# Patient Record
Sex: MALE | Race: WHITE
[De-identification: names, ages, dates, MRNs, and addresses within clinical notes are randomized per-mention and may not be internally consistent; named-entity substitution may affect disease eponyms.]

---

## 2017-01-12 ENCOUNTER — HOSPITAL ENCOUNTER (OUTPATIENT)
Dept: HOSPITAL 80 - F3E | Age: 72
Setting detail: OBSERVATION
LOS: 1 days | Discharge: HOME | End: 2017-01-13
Attending: SPECIALIST | Admitting: SPECIALIST
Payer: COMMERCIAL

## 2017-01-12 DIAGNOSIS — I25.10: ICD-10-CM

## 2017-01-12 DIAGNOSIS — R35.0: ICD-10-CM

## 2017-01-12 DIAGNOSIS — N40.1: Primary | ICD-10-CM

## 2017-01-12 DIAGNOSIS — N13.9: ICD-10-CM

## 2017-01-12 DIAGNOSIS — Z95.5: ICD-10-CM

## 2017-01-12 DIAGNOSIS — I10: ICD-10-CM

## 2017-01-12 DIAGNOSIS — R97.20: ICD-10-CM

## 2017-01-12 DIAGNOSIS — R35.1: ICD-10-CM

## 2017-01-12 DIAGNOSIS — N42.0: ICD-10-CM

## 2017-01-12 DIAGNOSIS — E11.9: ICD-10-CM

## 2017-01-12 DIAGNOSIS — Z82.49: ICD-10-CM

## 2017-01-12 PROCEDURE — 0VC08ZZ EXTIRPATION OF MATTER FROM PROSTATE, VIA NATURAL OR ARTIFICIAL OPENING ENDOSCOPIC: ICD-10-PCS | Performed by: SPECIALIST

## 2017-01-12 PROCEDURE — 0VB08ZX EXCISION OF PROSTATE, VIA NATURAL OR ARTIFICIAL OPENING ENDOSCOPIC, DIAGNOSTIC: ICD-10-PCS | Performed by: SPECIALIST

## 2017-01-12 PROCEDURE — P9041 ALBUMIN (HUMAN),5%, 50ML: HCPCS

## 2017-01-12 PROCEDURE — 52601 PROSTATECTOMY (TURP): CPT

## 2017-01-12 PROCEDURE — G0378 HOSPITAL OBSERVATION PER HR: HCPCS

## 2017-01-12 NOTE — GHP
[f 
rep st]



                                                       PREOP HISTORY AND 
PHYSICAL





Amended report



ADMISSION DIAGNOSIS:  Benign prostatic hypertrophy with urinary obstruction and 
elevated prostate-specific antigen.



HISTORY OF PRESENT ILLNESS:  By history this is a 71-year-old gentleman who has 
been referred by Dr. Yahir Shafer for evaluation of urinary symptoms.  When 
we saw him, he had nocturia, frequency, and occasional urge incontinence.  He 
got up every 2 hours at nighttime to void, and hydration at night makes these 
symptoms worse.  He has had diabetes managed with metformin.  When we had seen 
him originally, his hemoglobin A1c at that time was 6.1.  He also had a 
prostate procedure in 2004, which was microwave of the prostate.  He did a 
trial of Flomax, which helped his flow but did not improve his nocturia.  His 
PSA was 4.4 in 2016 compared to 3.8 in 2014.  He had a PSA density of 0.9.  He 
had a 4K score of 26%.  On an ultrasound that was done on 12/27/2016, his 
prostate volume at that time was 51.5 g, and he had a cyst noted in the 
prostate as well as some calcifications, and there was no suspicious area 
noted.  We discussed with him that consideration of a prostate biopsy prior to 
the TURP, but because of his urinary symptoms recommended he undergo the TURP 
and to send specimen to the lab for final pathology.  He also has stones in the 
prostate that kind of indicate the potential for a GreenLight laser procedure.



PAST MEDICAL HISTORY:  BPH with lower urinary tract symptoms.  He has a history 
of hypertension, hypercholesterolemia.



PREVIOUS SURGERIES:  Angioplasty for coronary artery disease, ankle surgery, ___
______, and the previous transurethral microwave therapy of the prostate.



MEDICATIONS:  Amlodipine, aspirin, atorvastatin, lisinopril, metformin.



ALLERGIES:  Clindamycin.



FAMILY HISTORY:  Positive for heart disease, hypertension, liver cancer.



SOCIAL HISTORY:  Moderate alcohol consumption, nonsmoker.



REVIEW OF SYSTEMS:  Negative respiratory, GI, endocrine.  Cardiac is actually 
positive because of the previous coronary artery disease.



PHYSICAL EXAM:  GENERAL:  Mild abdominal obesity.  HEAD EARS, EYES, NOSE, THROAT
:  Normal.  CHEST:  Clear.  HEART:  Regular rate and rhythm.  ABDOMEN:  Obesity 
noted.  RECTAL:  Notes 50 g prostate with no nodules.  The consistency is firm 
consistent with previous microwave therapy.



PLAN:  At the present time he is admitted for transurethral resection of the 
prostate.  Indication, complications, and expectations discussed.  He did have 
a urodynamic study preoperative that showed his bladder capacity was somewhat 
diminished with a high detrusor voiding pressures up to 111 cm of water pressure
, and he had a peak flow of 7 mL/second, average flow of 3.5 mL/second, and a 
postvoid residual on that study date of 97 mL.  This has been discussed with 
him.  He is admitted for the procedure.





Job #:  [id number]



Add acc#, 01/12/17, olman COSTA

## 2017-01-12 NOTE — POSTOPPROG
Post Op Note


Date of Operation: 01/12/17


Surgeon: Maykel Shaikh


Anesthesiologist: Corey


Anesthesia: GET(General Endotracheal)


Pre-op Diagnosis: bPH


Post-op Diagnosis: SAME


Indication: BPH / URGE, FREQ, OBST


Procedure: TURP


Findings: BPH


Inf/Abcess present in the surg proc area at time of surgery?: No


EBL: Minimal


Complications: 


NONE


Drains: Other (HERR)


Specimen(s): 


SENT--DICTATED

## 2017-01-12 NOTE — GOP
[f rep st]



                                                                OPERATIVE REPORT





DATE OF OPERATION:  01/12/2017



SURGEON:  Maykel Shaikh MD



ANESTHESIA:  General anesthesia.



ANESTHESIOLOGIST:  Dr. Nicole.



PREOPERATIVE DIAGNOSIS:  BPH with urinary obstruction, elevated PSA, and prostatic stones.



POSTOPERATIVE DIAGNOSIS:  BPH with urinary obstruction, elevated PSA, and prostatic stones.



PROCEDURE PERFORMED:  Transurethral resection of the prostate.



FINDINGS:  



SPECIMENS:  Sent to Pathology.



ESTIMATED BLOOD LOSS:  Less than 50 mL.



DESCRIPTION OF PROCEDURE:  After undergoing general anesthesia, prep and drape in normal sterile fash
ion, appropriate time-out, the resectoscope was passed in the bladder under direct vision.  He had so
me fixation of the external sphincter that was somewhat fixed, probably from his previous microwave t
herapy.  Then, at that point, entering the bladder, he had no tumors, stones, foreign bodies in the b
ladder.  Because of the high elevated bladder neck, I started by taking that down with the bipolar lo
op and resected that, and then resected the right lateral lobe and right portion of the posterior lob
e, left lower lobe, and a left portion of the posterior lobe resected in a similar fashion.  He had a
 cystic area in the prostate that had multiple stones.  It was unroofed and then at the end of the pr
ocedure, Anel used to free his bladder of all chips and clots.  Visualization revealed no residual c
hips or clots.  Ureteral orifices preserved.  External sphincter approximated at the midline symmetri
thalia.  Verumontanum was preserved and Urojet placed in the urethra.  A 3-way Doshi catheter passed o
magda a Mandarin guide, 45 cc balloon inflated.  Traction placed, irrigated clear, and is being admitte
d for postoperative care.  I have discussed the findings and issues with his wife.





Job #:  [id number]

## 2017-01-13 VITALS
SYSTOLIC BLOOD PRESSURE: 128 MMHG | TEMPERATURE: 98.3 F | HEART RATE: 78 BPM | DIASTOLIC BLOOD PRESSURE: 77 MMHG | OXYGEN SATURATION: 95 %

## 2017-01-13 VITALS — RESPIRATION RATE: 16 BRPM

## 2017-01-13 NOTE — SOAPPROG
SOAP Progress Note


Assessment/Plan: 


Assessment:  BPH w urinary obs/LUTS   Acute  POD 1, path pending   








Plan: DC reyes and plan for home dc








01/13/17 08:44





Subjective: 


doing ok


Objective: 





 Vital Signs











Temp Pulse Resp BP Pulse Ox


 


 36.9 C   71   16   134/77 H  97 


 


 01/13/17 07:25  01/13/17 07:25  01/13/17 07:25  01/13/17 07:25  01/13/17 07:25








 











 01/12/17 01/13/17 01/14/17





 05:59 05:59 05:59


 


Intake Total  1710 


 


Output Total  4250 


 


Balance  -2540 














Physical Exam





- Physical Exam


General Appearance: alert


Respiratory: No respiratory distress


Abdomen: soft


Extremities: No calf tenderness


Neuro/Psych: alert, oriented x 3





ICD10 Worksheet


Patient Problems: 


 Problems











Problem Status Diagnosed


 


BPH w urinary obs/LUTS Acute 














- ICD10 Problem Qualifiers


(1) BPH w urinary obs/LUTS

## 2017-01-17 NOTE — GDS
[f 
rep st]



                                                             DISCHARGE SUMMARY





PREOPERATIVE AND POSTOPERATIVE DIAGNOSIS:  BPH with urinary obstruction and 
prostatic stones along with elevated PSA.  



The patient, after a long discussion of options and evaluation in our office, 
was found to have BPH and elected to have a transurethral resection of the 
prostate done.  This procedure was done without any difficulty.  The catheter 
was removed prior to patient's discharge and he reported voiding without 
difficulty in the hospital.  He is being discharged home in good condition and 
should follow up with our office in 3 weeks.





Job #:  [id number]

MTDD

## 2018-10-31 ENCOUNTER — HOSPITAL ENCOUNTER (OUTPATIENT)
Dept: HOSPITAL 80 - FCATH | Age: 73
Discharge: HOME | End: 2018-10-31
Attending: INTERNAL MEDICINE
Payer: COMMERCIAL

## 2018-10-31 DIAGNOSIS — E11.9: ICD-10-CM

## 2018-10-31 DIAGNOSIS — I48.2: ICD-10-CM

## 2018-10-31 DIAGNOSIS — I35.0: Primary | ICD-10-CM

## 2018-10-31 DIAGNOSIS — I25.10: ICD-10-CM

## 2018-10-31 DIAGNOSIS — I11.9: ICD-10-CM

## 2018-10-31 LAB
INR PPP: 1.01 (ref 0.83–1.16)
PLATELET # BLD: 209 10^3/UL (ref 150–400)
PROTHROMBIN TIME: 13.5 SEC (ref 12–15)

## 2018-10-31 NOTE — CPIP
DATE OF PROCEDURE:  10/31/2018



INDICATIONS FOR PROCEDURE:  Shortness of breath, chest pain, history of critical aortic stenosis.



PROCEDURE:  

1.  Nonselective left groin sheathogram.

2.  7-Mexican sheath left common femoral vein.

3.  Right heart catheterization with [place]-Shona catheter.

4.  Bilateral coronary angiography.

5.  Abdominal aorta angiography.



HISTORY:  This is a 73-year-old male with history of severe aortic stenosis, who has now been complai
clifford of shortness of breath and chest pain.  The patient saw CT surgery and was deemed to be a suitab
le candidate for open AVR.  The patient was sent for right and left heart catheterization for interve
ntion for open AVR.



DESCRIPTION OF PROCEDURE:  After informed consent, the patient was brought to [facility], where the left iwona
in was prepped and draped in sterile fashion.  Using lidocaine, a short 6-Mexican sheath in the left c
ommon femoral artery, 7-Mexican sheath in the left common femoral vein.  A [place]-Shona catheter was then
 advanced.  Wedge pressure was mean of 18, A-wave 19, V-wave 19, PA pressure systolic 31, diastolic 2
1, mean of 24, RV pressure systolic 37, diastolic 7, end of 12, RA pressure mean of 9, A-wave of 12, 
V-wave 11.  Cardiac output was measured to be 4.9 by Irma with the index 2.5.  PA sat was 74%.  AO sa
t was 96%.  S[place]-Shona catheter was then removed.  We then proceeded with a JL4 catheter to the left c
oronary artery.  Left coronary artery revealed a short left main.  Left circumflex artery was normal 
proximally, gave off a medium to large marginal artery which had extensive stenting in the proximal a
spect.  There was a focal 70% in-stent restenosis noted proximally in the stent.  There was a margina
l 2 artery giving off an LPLS which was extensively stented as well.  There was mild to moderate in-s
tent restenosis, but no focal high-grade stenosis in this vessel.  The LAD was a long vessel with onl
y mild plaque disease.  The LAD gave off a tiny diagonal artery proximally which had 40% proximal dis
ease, but again, this was a small vessel.  The LAD did have 30% disease in its proximal midportion, b
ut no focal high-grade stenosis.  After these images were obtained, the JL4 catheter removed.  A JR4 
catheter was then taken to the right coronary artery.  The JR4 catheter was advanced to the right cor
onary artery.  Images of the right coronary artery revealed normal ostial RCA.  The mid RCA was stent
ed and had mild in-stent restenosis, but no severe occlusion.  Distally, the RPDA appeared to be heal
thy and free of disease.  After these images were obtained, the JR4 catheter was removed.  The pigtai
l catheter was advanced to the ascending aorta.  Abdominal aortogram showed widely patent descending 
aorta, widely patent common, external and internal iliac arteries.  The pigtail catheter was then rem
teresa over the 3.5 wire, left groin was closed with manual pressure.  The patient tolerated the proced
ure well without complication.



IMPRESSION:  

1.  Severe focal in-stent restenosis of a marginal 1 artery.

2.  Moderate in-stent restenosis of the marginal 2 artery.

3.  A 30% disease of the proximal left anterior descending.

4.  Mild in-stent restenosis of the mid right coronary artery.

5.  Normal cardiac output.

6.  Normal pulmonic pressures.

7.  Widely patent aortoiliac conduit.



PLAN:  The patient would benefit from a single-vessel bypass to the marginal 1 artery, as this appear
s to be the highest grade lesion in the coronary circulation.  There is moderate in-stent restenosis 
of the marginal 2 artery, as well as mild to moderate in-stent restenosis of the right coronary arter
y, but these did not appear to be flow limiting.  We will present the case to Dr. Linares for further 
evaluation.





Job #:  [id number]

## 2018-11-04 NOTE — CPEKG
Test Reason : OPEN

Blood Pressure : ***/*** mmHG

Vent. Rate : 083 BPM     Atrial Rate : 150 BPM

   P-R Int : 155 ms          QRS Dur : 091 ms

    QT Int : 371 ms       P-R-T Axes : -04 049 092 degrees

   QTc Int : 436 ms

 

Sinus rhythm

Atrial premature complexes

Nonspecific T abnormalities, lateral leads

Minimal ST elevation, anterior leads

 

Confirmed by Ubaldo Jackson ([id number]) on 11/4/2018 2:50:08 PM

 

Referred By:             Confirmed By:Ubaldo Jackson

## 2018-11-06 ENCOUNTER — HOSPITAL ENCOUNTER (OUTPATIENT)
Dept: HOSPITAL 80 - FIMAGING | Age: 73
End: 2018-11-06
Attending: THORACIC SURGERY (CARDIOTHORACIC VASCULAR SURGERY)
Payer: COMMERCIAL

## 2018-11-06 DIAGNOSIS — M85.89: ICD-10-CM

## 2018-11-06 DIAGNOSIS — I25.10: Primary | ICD-10-CM

## 2018-11-08 ENCOUNTER — HOSPITAL ENCOUNTER (INPATIENT)
Dept: HOSPITAL 80 - F3E | Age: 73
LOS: 4 days | Discharge: HOME | DRG: 220 | End: 2018-11-12
Attending: THORACIC SURGERY (CARDIOTHORACIC VASCULAR SURGERY) | Admitting: THORACIC SURGERY (CARDIOTHORACIC VASCULAR SURGERY)
Payer: COMMERCIAL

## 2018-11-08 DIAGNOSIS — I10: ICD-10-CM

## 2018-11-08 DIAGNOSIS — D62: ICD-10-CM

## 2018-11-08 DIAGNOSIS — I35.0: Primary | ICD-10-CM

## 2018-11-08 DIAGNOSIS — Z95.5: ICD-10-CM

## 2018-11-08 DIAGNOSIS — E11.9: ICD-10-CM

## 2018-11-08 DIAGNOSIS — I25.10: ICD-10-CM

## 2018-11-08 DIAGNOSIS — N32.89: ICD-10-CM

## 2018-11-08 DIAGNOSIS — Z85.46: ICD-10-CM

## 2018-11-08 DIAGNOSIS — T82.855A: ICD-10-CM

## 2018-11-08 PROCEDURE — 021009W BYPASS CORONARY ARTERY, ONE ARTERY FROM AORTA WITH AUTOLOGOUS VENOUS TISSUE, OPEN APPROACH: ICD-10-PCS | Performed by: THORACIC SURGERY (CARDIOTHORACIC VASCULAR SURGERY)

## 2018-11-08 PROCEDURE — 02RF08Z REPLACEMENT OF AORTIC VALVE WITH ZOOPLASTIC TISSUE, OPEN APPROACH: ICD-10-PCS | Performed by: THORACIC SURGERY (CARDIOTHORACIC VASCULAR SURGERY)

## 2018-11-08 PROCEDURE — 5A1221Z PERFORMANCE OF CARDIAC OUTPUT, CONTINUOUS: ICD-10-PCS | Performed by: THORACIC SURGERY (CARDIOTHORACIC VASCULAR SURGERY)

## 2018-11-08 PROCEDURE — P9041 ALBUMIN (HUMAN),5%, 50ML: HCPCS

## 2018-11-08 PROCEDURE — 06BQ4ZZ EXCISION OF LEFT SAPHENOUS VEIN, PERCUTANEOUS ENDOSCOPIC APPROACH: ICD-10-PCS | Performed by: THORACIC SURGERY (CARDIOTHORACIC VASCULAR SURGERY)

## 2018-11-08 PROCEDURE — C1768 GRAFT, VASCULAR: HCPCS

## 2018-11-08 RX ADMIN — POTASSIUM CHLORIDE PRN MLS: 400 INJECTION, SOLUTION INTRAVENOUS at 20:29

## 2018-11-08 RX ADMIN — MUPIROCIN SCH APP: 20 OINTMENT TOPICAL at 21:06

## 2018-11-08 RX ADMIN — ALBUMIN (HUMAN) PRN MLS: 2.5 SOLUTION INTRAVENOUS at 18:04

## 2018-11-08 RX ADMIN — HYDROCODONE BITARTRATE AND ACETAMINOPHEN PRN TAB: 5; 325 TABLET ORAL at 22:59

## 2018-11-08 RX ADMIN — POTASSIUM CHLORIDE PRN MLS: 400 INJECTION, SOLUTION INTRAVENOUS at 17:52

## 2018-11-08 RX ADMIN — DOCUSATE SODIUM AND SENNOSIDES SCH: 50; 8.6 TABLET ORAL at 20:19

## 2018-11-08 RX ADMIN — Medication SCH MLS: at 21:06

## 2018-11-08 RX ADMIN — POTASSIUM CHLORIDE PRN MLS: 400 INJECTION, SOLUTION INTRAVENOUS at 17:44

## 2018-11-08 RX ADMIN — ALBUMIN (HUMAN) PRN MLS: 2.5 SOLUTION INTRAVENOUS at 19:59

## 2018-11-08 NOTE — PDANEPAE
ANE History of Present Illness





74 yo for avr/cabg





ANE Past Medical History





- Cardiovascular History


Hx Hypertension: Yes


Hx Arrhythmias: Yes


Hx Chest Pain: Yes


Hx Coronary Artery / Peripheral Vascular Disease: Yes


Hx CHF / Valvular Disease: Yes


Hx Palpitations: No


Cardiovascular History Comment: MILD AORTIC VALVE STENOSIS





- Pulmonary History


Hx COPD: No


Hx Asthma/Reactive Airway Disease: No


Hx Recent Upper Respiratory Infection: No


Hx Oxygen in Use at Home: No


Hx Sleep Apnea: No


Sleep Apnea Screening Result - Last Documented: Positive


Pulmonary History Comment: MOUNA TRIGGERS





- Neurologic History


Hx Cerebrovascular Accident: No


Hx Seizures: No


Hx Dementia: No





- Endocrine History


Hx Diabetes: Yes


Endocrine History Comment: TYPE 2





- Renal History


Hx Renal Disorders: Yes


Renal History Comment: BPH.  FREQUENCY.  HX OF TURP WITH CLAR 01/2017





- Liver History


Hx Hepatic Disorders: No





- Neurological & Psychiatric Hx


Hx Neurological and Psychiatric Disorders: No





- Cancer History


Hx Cancer: Yes


Cancer History Comment: BEING MONITORED CURRENTLY FOR PROSTATE CA WITH DAVE.  

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





- Congenital Disorder History


Hx Congenital Disorders: No





- GI History


Hx Gastrointestinal Disorders: No





- Other Health History


Other Health History: WEAR GLASSES.  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





- Chronic Pain History


Chronic Pain: No





- Surgical History


Prior Surgeries: 10/31/18 CATH WITH [facility].  TURP WITH DAVE 01/12/17.  

ANGIOPLASTY X2 2000.  LUMBAR LAMINECTOMY.  PROSTATE PROCEDURE.  RT WRIST ORIF.  

ANKLE.  T&A





ANE Review of Systems


Review of Systems: 








- Exercise capacity


METS (RN): 3 METS





ANE Patient History





- Allergies


Allergies/Adverse Reactions: 








clindamycin Allergy (Verified 11/05/18 12:09)


 Rash


codeine Allergy (Verified 11/05/18 12:09)


 Rash








- Home Medications


Home medications: home medication list seen and reviewed


Home Medications: 








Aspirin [Aspirin 325 mg (*)] 325 mg PO DAILY 01/02/17 [Last Taken 11/08/18]


Atorvastatin Calcium [Lipitor 40 mg (*)] 40 mg PO Q2D 01/02/17 [Last Taken 11/08 /18]


Lisinopril [Zestril 20 mg (*)] 20 mg PO DAILY 01/02/17 [Last Taken 11/08/18]


Multivitamins [Multivitamin (*)] 1 each PO DAILY 01/02/17 [Last Taken 11/08/18]


amLODIPine BESYLATE [Norvasc 10 mg (*)] 10 mg PO DAILY 01/02/17 [Last Taken 11/ 08/18]


metFORMIN HCL [Glucophage 500 mg (*)] 500 mg PO BIDMEAL 01/02/17 [Last Taken 11/ 08/18]


Mirabegron [Myrbetriq] 25 mg PO DAILY 10/30/18 [Last Taken 11/08/18]








- NPO status


NPO Status: no food or drink >8 hours


NPO Since - Liquids (Date): 11/08/18


NPO Since - Liquids (Time): 19:00


NPO Since - Solids (Date): 11/07/18


NPO Since - Solids (Time): 20:00





- Smoking Hx


Smoking Status: Never smoked





- Family Anes Hx


Family Hx Anesthesia Complications: NONE





ANE Labs/Vital Signs





- Vital Signs


Blood Pressure: 163/88


Heart Rate: 87


Respiratory Rate: 16


O2 Sat (%): 96


Height: 5 ft 8.11 in


Weight: 78.5 kg





ANE Physical Exam





- Airway


Neck exam: FROM


Mallampati Score: Class 2


Mouth exam: normal dental/mouth exam





- Pulmonary


Pulmonary: no respiratory distress





- Cardiovascular


Cardiovascular: regular rate and rhythym





- ASA Status


ASA Status: IV





ANE Anesthesia Plan


Anesthesia Plan: general endotracheal anesthesia


Lines/Monitors: arterial line, central line, HU

## 2018-11-08 NOTE — PDHPUP
History & Physical Update


H&P update statement: 


This history and physical update is based on an assessment of the patient which 

was completed after admission or registration (within 24 hours), but prior to 

the surgery/procedure.





H&P update: H&P reviewed & patient examined


H&P changes: severe obstructive in OM1

## 2018-11-09 LAB — PLATELET # BLD: 120 10^3/UL (ref 150–400)

## 2018-11-09 RX ADMIN — DOCUSATE SODIUM AND SENNOSIDES SCH TAB: 50; 8.6 TABLET ORAL at 20:43

## 2018-11-09 RX ADMIN — MUPIROCIN SCH APP: 20 OINTMENT TOPICAL at 20:57

## 2018-11-09 RX ADMIN — INSULIN LISPRO SCH UNITS: 100 INJECTION, SOLUTION INTRAVENOUS; SUBCUTANEOUS at 17:44

## 2018-11-09 RX ADMIN — Medication SCH MLS: at 06:18

## 2018-11-09 RX ADMIN — DOCUSATE SODIUM AND SENNOSIDES SCH TAB: 50; 8.6 TABLET ORAL at 09:29

## 2018-11-09 RX ADMIN — HYDROCODONE BITARTRATE AND ACETAMINOPHEN PRN TAB: 5; 325 TABLET ORAL at 09:28

## 2018-11-09 RX ADMIN — ASPIRIN 81 MG SCH MG: 81 TABLET ORAL at 09:29

## 2018-11-09 RX ADMIN — METOPROLOL TARTRATE SCH MG: 25 TABLET, FILM COATED ORAL at 14:42

## 2018-11-09 RX ADMIN — HYDROCODONE BITARTRATE AND ACETAMINOPHEN PRN TAB: 5; 325 TABLET ORAL at 04:33

## 2018-11-09 RX ADMIN — PANTOPRAZOLE SODIUM SCH MG: 40 TABLET, DELAYED RELEASE ORAL at 09:29

## 2018-11-09 RX ADMIN — Medication SCH MLS: at 13:39

## 2018-11-09 RX ADMIN — METOPROLOL TARTRATE SCH MG: 25 TABLET, FILM COATED ORAL at 20:43

## 2018-11-09 RX ADMIN — MUPIROCIN SCH APP: 20 OINTMENT TOPICAL at 09:31

## 2018-11-09 RX ADMIN — Medication SCH MLS: at 20:58

## 2018-11-09 NOTE — SOAPPROG
SOAP Progress Note


Assessment/Plan: 


POD #1: CABGx1 (SVG-OM2), AVR with #23 Magna bioprosthesis, EVH L thigh   





CAD with severe in-stent restenosis of OM2 s/p bypass with vein graft


- ASA/BB/statin for secondary prevention 


- CTs to remain, FC/AL to be removed


- [facility] to [facility]





Severe AS s/p AVR with bioprothesis 


- Mgmt as per CABG





Acute blood loss anemia 


- Stable without the need for transfusions 





DM 2 (A1c 6.5%)


- Insulin gtt to be converted to ISS


- Metformin to be restarted on discharge





Spastic bladder


- Home med restarted 





DVT prophylaxis


- SCDs only











Subjective: 


Denies pain/SOB. No complaints.


Objective: 





 Vital Signs











Temp Pulse Resp BP Pulse Ox


 


 37.0 C   90   10 L  110/50 L  94 


 


 11/09/18 05:00  11/09/18 06:00  11/09/18 06:00  11/09/18 06:00  11/09/18 06:00








 Laboratory Results





 11/09/18 03:05 





 11/09/18 03:05 





 











 11/08/18 11/09/18 11/10/18





 05:59 05:59 05:59


 


Intake Total  1923 


 


Output Total  847 350


 


Balance  1076 -350














Physical Exam





- Physical Exam


General Appearance: WD/WN, alert, no apparent distress


EENT: No scleral icterus (R), No scleral icterus (L)


Neck: normal inspection


Respiratory: No respiratory distress


Cardiac/Chest: regular rate, rhythm


Abdomen: non-tender, soft, No distended


Skin: normal color, warm/dry


Extremities: No pedal edema


Neuro/Psych: no motor/sensory deficits, alert, normal mood/affect, oriented x 3





ICD10 Worksheet


Patient Problems: 


 Problems











Problem Status Onset


 


Acute blood loss as cause of postoperative anemia Acute  


 


Aortic stenosis Acute  


 


Coronary stent restenosis Acute  


 


S/P coronary artery bypass graft x 1 Acute  


 


BPH w urinary obs/LUTS Acute

## 2018-11-09 NOTE — PDMN
Medical Necessity


Medical necessity: Pt meets inpt criteria per MD order and [facility] [address], Coronary 

Artery Bypass Graft, Medicare inpt only list, 4 days. 72 y/o admitted for AVR 

and CABG X 1 and post-op care, anticipate >2MN.

## 2018-11-09 NOTE — ASMTCMCOM
CM Note

 

CM Note                       

Notes:

Patient is POD #1 CABG x1 and AVR. He is stable and doing well.



He is normally independent and lives with his wife Fausto. Initial PT eval suggests that he 

will not have any d/c needs. Case Management available if this changes. 

 

Date Signed:  11/09/2018 01:03 PM

Electronically Signed By:Rayne Montalvo RN

## 2018-11-10 LAB — PLATELET # BLD: 121 10^3/UL (ref 150–400)

## 2018-11-10 RX ADMIN — INSULIN LISPRO SCH: 100 INJECTION, SOLUTION INTRAVENOUS; SUBCUTANEOUS at 13:43

## 2018-11-10 RX ADMIN — INSULIN LISPRO SCH: 100 INJECTION, SOLUTION INTRAVENOUS; SUBCUTANEOUS at 17:35

## 2018-11-10 RX ADMIN — ASPIRIN 81 MG SCH MG: 81 TABLET ORAL at 09:28

## 2018-11-10 RX ADMIN — AMIODARONE HYDROCHLORIDE SCH MG: 200 TABLET ORAL at 21:43

## 2018-11-10 RX ADMIN — DOCUSATE SODIUM AND SENNOSIDES SCH TAB: 50; 8.6 TABLET ORAL at 09:28

## 2018-11-10 RX ADMIN — OXYCODONE HYDROCHLORIDE AND ACETAMINOPHEN PRN TAB: 5; 325 TABLET ORAL at 17:37

## 2018-11-10 RX ADMIN — OXYCODONE HYDROCHLORIDE AND ACETAMINOPHEN PRN TAB: 5; 325 TABLET ORAL at 13:09

## 2018-11-10 RX ADMIN — PANTOPRAZOLE SODIUM SCH MG: 40 TABLET, DELAYED RELEASE ORAL at 09:28

## 2018-11-10 RX ADMIN — Medication SCH MLS: at 05:14

## 2018-11-10 RX ADMIN — MUPIROCIN SCH APP: 20 OINTMENT TOPICAL at 09:32

## 2018-11-10 RX ADMIN — METOPROLOL TARTRATE SCH MG: 25 TABLET, FILM COATED ORAL at 09:29

## 2018-11-10 RX ADMIN — DOCUSATE SODIUM AND SENNOSIDES SCH TAB: 50; 8.6 TABLET ORAL at 21:43

## 2018-11-10 RX ADMIN — AMIODARONE HYDROCHLORIDE SCH MG: 200 TABLET ORAL at 13:10

## 2018-11-10 RX ADMIN — METOPROLOL TARTRATE SCH MG: 25 TABLET, FILM COATED ORAL at 21:43

## 2018-11-10 RX ADMIN — HYDROCODONE BITARTRATE AND ACETAMINOPHEN PRN TAB: 5; 325 TABLET ORAL at 05:14

## 2018-11-10 RX ADMIN — INSULIN LISPRO SCH: 100 INJECTION, SOLUTION INTRAVENOUS; SUBCUTANEOUS at 10:07

## 2018-11-10 RX ADMIN — OXYCODONE HYDROCHLORIDE AND ACETAMINOPHEN PRN TAB: 5; 325 TABLET ORAL at 21:48

## 2018-11-10 NOTE — CPEKG
Test Reason : OPEN

Blood Pressure : ***/*** mmHG

Vent. Rate : 081 BPM     Atrial Rate : 082 BPM

   P-R Int : 191 ms          QRS Dur : 088 ms

    QT Int : 413 ms       P-R-T Axes : 084 050 109 degrees

   QTc Int : 480 ms

 

Sinus rhythm

Nonspecific T abnormalities, lateral leads

 

Confirmed by Harshad Mondragon ([id number]) on 11/10/2018 7:51:42 AM

 

Referred By:             Confirmed By:Harshad Mondragon

## 2018-11-10 NOTE — SOAPPROG
SOAP Progress Note


Assessment/Plan: 





POD #2: CABGx1 (SVG-OM2), AVR with #23 Magna bioprosthesis, EVH L thigh   





CAD with severe in-stent restenosis of OM2 s/p bypass with vein graft


- ASA/BB/statin for secondary prevention 


- CTs to remain 





Severe AS s/p AVR with bioprothesis 


- On ASA and Metoprolol 


- Lasix 40mg po x 1 today





Paroxysmal atrial fibrillation/flutter


- Remains in Afib since yesterday.  Rate in the 's.


- Amiodarone 400mg po BID started.


- Continue Metprolol 12.5mg po BID.


- May need to consider starting Coumaind if persists.


- Keep pacing wires in place for today. 





Acute blood loss anemia 


- Stable without the need for transfusions 





Leukocytosis


- Worsening with WBC 23.2 (16.5), patient afebrile.


- Check UA and repeat CXR in am.  


- Will follow





DM 2 (A1c 6.5%)


- On ISS


- Metformin to be restarted on discharge





Hx Prostate cancer and spastic bladder


- Followed by Urologist Pio Shaikh


- Home med restarted 





DVT prophylaxis


- SCDs only








Subjective: 





Patient reports suboptimal pain control.  





Objective: 





 Vital Signs











Temp Pulse Resp BP Pulse Ox


 


 36.6 C   95   16   119/71   95 


 


 11/10/18 07:27  11/10/18 07:27  11/10/18 07:27  11/10/18 07:27  11/10/18 07:27








 Laboratory Results





 11/10/18 05:30 





 11/10/18 05:30 





 











 11/09/18 11/10/18 11/11/18





 05:59 05:59 05:59


 


Intake Total 1923 1490 


 


Output Total 847 1455 


 


Balance 1076 35 














Physical Exam





- Physical Exam


General Appearance: WD/WN, alert, no apparent distress


Neck: supple


Respiratory: lungs clear, decreased breath sounds (bases), other (No wheezing, 

rhonchi, rales. )


Cardiac/Chest: irregularly irregular, other (no mumurs, rubs, gallops.  sternum 

stable, sternotomy c/d/i. )


Abdomen: normal bowel sounds, non-tender, soft


Skin: normal color, warm/dry


Extremities: other (warm, 1+ lower extremity pitting edema)


Neuro/Psych: alert, normal mood/affect, oriented x 3





ICD10 Worksheet


Patient Problems: 


 Problems











Problem Status Onset


 


Acute blood loss as cause of postoperative anemia Acute  


 


Aortic stenosis Acute  


 


BPH w urinary obs/LUTS Acute  


 


Coronary stent restenosis Acute  


 


S/P coronary artery bypass graft x 1 Acute

## 2018-11-11 LAB — PLATELET # BLD: 113 10^3/UL (ref 150–400)

## 2018-11-11 RX ADMIN — ASPIRIN 81 MG SCH MG: 81 TABLET ORAL at 09:41

## 2018-11-11 RX ADMIN — INSULIN LISPRO SCH: 100 INJECTION, SOLUTION INTRAVENOUS; SUBCUTANEOUS at 13:19

## 2018-11-11 RX ADMIN — AMIODARONE HYDROCHLORIDE SCH MG: 200 TABLET ORAL at 20:24

## 2018-11-11 RX ADMIN — OXYCODONE HYDROCHLORIDE AND ACETAMINOPHEN PRN TAB: 5; 325 TABLET ORAL at 10:39

## 2018-11-11 RX ADMIN — OXYCODONE HYDROCHLORIDE AND ACETAMINOPHEN PRN TAB: 5; 325 TABLET ORAL at 02:56

## 2018-11-11 RX ADMIN — DOCUSATE SODIUM AND SENNOSIDES SCH TAB: 50; 8.6 TABLET ORAL at 09:41

## 2018-11-11 RX ADMIN — PANTOPRAZOLE SODIUM SCH MG: 40 TABLET, DELAYED RELEASE ORAL at 09:40

## 2018-11-11 RX ADMIN — OXYCODONE HYDROCHLORIDE AND ACETAMINOPHEN PRN TAB: 5; 325 TABLET ORAL at 23:09

## 2018-11-11 RX ADMIN — METOPROLOL TARTRATE SCH MG: 25 TABLET, FILM COATED ORAL at 20:24

## 2018-11-11 RX ADMIN — AMIODARONE HYDROCHLORIDE SCH MG: 200 TABLET ORAL at 09:40

## 2018-11-11 RX ADMIN — OXYCODONE HYDROCHLORIDE AND ACETAMINOPHEN PRN TAB: 5; 325 TABLET ORAL at 06:08

## 2018-11-11 RX ADMIN — METOPROLOL TARTRATE SCH MG: 25 TABLET, FILM COATED ORAL at 09:42

## 2018-11-11 RX ADMIN — INSULIN LISPRO SCH: 100 INJECTION, SOLUTION INTRAVENOUS; SUBCUTANEOUS at 18:18

## 2018-11-11 RX ADMIN — INSULIN LISPRO SCH: 100 INJECTION, SOLUTION INTRAVENOUS; SUBCUTANEOUS at 09:25

## 2018-11-11 RX ADMIN — FUROSEMIDE SCH MG: 40 TABLET ORAL at 14:58

## 2018-11-11 RX ADMIN — HYDROCODONE BITARTRATE AND ACETAMINOPHEN PRN TAB: 5; 325 TABLET ORAL at 18:15

## 2018-11-11 RX ADMIN — DOCUSATE SODIUM AND SENNOSIDES SCH TAB: 50; 8.6 TABLET ORAL at 20:24

## 2018-11-11 NOTE — SOAPPROG
<Duke Garrett - Last Filed: 11/11/18 13:49>





SOAP Progress Note


Assessment/Plan: 


Assessment:





Agree with note. Doing well. Increase ambulation. D/C tubes. Home in 1-2 days.




















Plan:





11/11/18 13:49





Objective: 





 Vital Signs











Temp Pulse Resp BP Pulse Ox


 


 36.6 C   76   19   109/64   92 


 


 11/11/18 12:00  11/11/18 12:00  11/11/18 12:00  11/11/18 12:00  11/11/18 12:15








 Laboratory Results





 11/11/18 03:05 





 11/11/18 03:05 





 











 11/10/18 11/11/18 11/12/18





 05:59 05:59 05:59


 


Intake Total 1490 1403 


 


Output Total 1455 1495 350


 


Balance 35 -92 -350














ICD10 Worksheet


Patient Problems: 


 Problems











Problem Status Onset


 


Acute blood loss as cause of postoperative anemia Acute  


 


Aortic stenosis Acute  


 


BPH w urinary obs/LUTS Acute  


 


Coronary stent restenosis Acute  


 


S/P coronary artery bypass graft x 1 Acute  














<Lilian Dodge - Last Filed: 11/11/18 14:33>





SOAP Progress Note


Assessment/Plan: 





POD #3: CABGx1 (SVG-OM2), AVR with #23 Magna bioprosthesis, EVH L thigh   





CAD with severe in-stent restenosis of OM2 s/p bypass with vein graft


- ASA/BB/statin for secondary prevention 


- CTs to be removed today.





Severe AS s/p AVR with bioprothesis 


- On ASA and Metoprolol 


- Will start Lasix 40mg daily.





Paroxysmal atrial fibrillation/flutter


- Back in SR 80's today.  


- On Amiodarone 400mg po BID and Metoprolol 12.5mg po BID.


- Will d/c pacing wires today.  


 


Acute blood loss anemia 


- Stable without the need for transfusions 





Leukocytosis


- Improving with WBC 19.0 (23.2), patient afebrile.


- UA negative and CXR unremarkable. 


- Will follow





DM 2 (A1c 6.5%)


- On ISS


- Metformin to be restarted on discharge





Hx Prostate cancer and spastic bladder


- Followed by Urologist Pio Shaikh


- Home med restarted 





DVT prophylaxis


- SCDs only





   





Subjective: 





Patient without complaints.  Reports good pain control.  











Objective: 





 Vital Signs











Temp Pulse Resp BP Pulse Ox


 


 36.6 C   76   19   109/64   92 


 


 11/11/18 12:00  11/11/18 12:00  11/11/18 12:00  11/11/18 12:00  11/11/18 12:15








 Laboratory Results





 11/11/18 03:05 





 11/11/18 03:05 





 











 11/10/18 11/11/18 11/12/18





 05:59 05:59 05:59


 


Intake Total 1490 1403 


 


Output Total 1455 1495 350


 


Balance 35 -92 -350














Physical Exam





- Physical Exam


General Appearance: WD/WN, alert, no apparent distress


Neck: supple


Respiratory: lungs clear, decreased breath sounds (bases)


Cardiac/Chest: regular rate, rhythm, other (No murmurs, rubs, gallops.  Sternum 

stable.  Sternotomy c/d/i. )


Abdomen: normal bowel sounds, non-tender, soft


Skin: normal color, warm/dry


Extremities: other (Warm, minimal lower extremity edema.  Leg incision c/d/i. )


Neuro/Psych: alert, normal mood/affect, oriented x 3

## 2018-11-11 NOTE — ASMTCMCOM
CM Note

 

CM Note                       

Notes:

CM discussed case with RN, patient likely to discharge in 1-2 days independent to home. PT 

recommendation is outpatient rehab and cardiac rehab. CM to follow. 



Current discharge plan: home independent 1-2 days.

 

Date Signed:  11/11/2018 04:51 PM

Electronically Signed By:Kiley Albrecht

## 2018-11-12 VITALS — SYSTOLIC BLOOD PRESSURE: 118 MMHG | DIASTOLIC BLOOD PRESSURE: 76 MMHG

## 2018-11-12 LAB — PLATELET # BLD: 112 10^3/UL (ref 150–400)

## 2018-11-12 RX ADMIN — ASPIRIN 81 MG SCH MG: 81 TABLET ORAL at 08:55

## 2018-11-12 RX ADMIN — DOCUSATE SODIUM AND SENNOSIDES SCH TAB: 50; 8.6 TABLET ORAL at 08:55

## 2018-11-12 RX ADMIN — FUROSEMIDE SCH MG: 40 TABLET ORAL at 08:55

## 2018-11-12 RX ADMIN — OXYCODONE HYDROCHLORIDE AND ACETAMINOPHEN PRN TAB: 5; 325 TABLET ORAL at 06:03

## 2018-11-12 RX ADMIN — PANTOPRAZOLE SODIUM SCH MG: 40 TABLET, DELAYED RELEASE ORAL at 08:55

## 2018-11-12 RX ADMIN — OXYCODONE HYDROCHLORIDE AND ACETAMINOPHEN PRN TAB: 5; 325 TABLET ORAL at 08:56

## 2018-11-12 RX ADMIN — METOPROLOL TARTRATE SCH MG: 25 TABLET, FILM COATED ORAL at 08:56

## 2018-11-12 RX ADMIN — AMIODARONE HYDROCHLORIDE SCH MG: 200 TABLET ORAL at 08:55

## 2018-11-12 RX ADMIN — INSULIN LISPRO SCH: 100 INJECTION, SOLUTION INTRAVENOUS; SUBCUTANEOUS at 08:06

## 2018-11-12 NOTE — GOP
DATE OF OPERATION:  11/08/2018



SURGEON:  Regulo Linares MD



ASSISTANT:  Jesús Abel PA-C



PREOPERATIVE DIAGNOSIS:  

1.  Severe aortic stenosis.

2.  Single-vessel coronary artery disease.



POSTOPERATIVE DIAGNOSIS:  

1.  Severe aortic stenosis.

2.  Single-vessel coronary artery disease.



PROCEDURE PERFORMED:  

1.  Aortic valve replacement using a 23 mm Magna bioprosthesis.

2.  Single-vessel coronary artery bypass grafting with saphenous vein graft from aorta to M2.

3.  Endoscopic vein harvest from the left thigh.



FINDINGS:  





DESCRIPTION OF PROCEDURE:  Patient was taken to the operating room and placed on the operating table 
in the supine position.  After the induction of general anesthesia and single-lumen endotracheal tube
 intubation, patient was prepped and draped sterilely.  A standard median sternotomy was performed wh
ile the saphenous vein was harvested from the upper portion of the left leg using a minimally invasiv
e endoscopic technique.  Patient was next heparinized and then cannulated with the Sarns 8.0 soft-corinna
w aortic cannula as well as a dual-stage venous right atrial cannula.  Cardiopulmonary bypass was ins
tituted and the distal vessel was marked for grafting.  The cross clamp was applied and the heart was
 arrested with 1 L of [place] solution.  The marginal branch of the circumflex was dissected open, p
robed, and anastomosed end-to-side to a vein graft using running 7-0 Prolene.  Next, the aorta was op
ened.  A trileaflet heavily calcified valve was encountered.  It was resected and the annulus was met
iculously debrided.  It was then sized to a 23 mm Magna.  Sutures were placed around the annulus.  We
 put some on the ventricular side and the valve was seated without difficulty.  The aorta was then cl
osed in 2 layers and the cross clamp was removed.  The partial occlusion clamp was placed and the vei
n graft was anastomosed end-to-side to the ascending aorta using running 6-0 Prolene suture.  The 
ss clamp was then removed.  Two ventricular pacing wires were then placed.  Patient was  fro
m bypass without difficulty and the post pump transesophageal echo showed a normally functioning biop
rosthetic valve in the aortic position and preservation of left ventricular function.  Once this was 
confirmed, the Protamine was administered and the patient was decannulated.  All of the cannulation s
ites were doubly secured with Prolene suture, and after hemostasis had been achieved, the heart was t
hen covered with pericardium and fat and the chest was closed with #6 stainless steel wires.  Subcuta
neous tissue and skin were closed with running Vicryl suture.  Patient tolerated the procedure well.



HISTORY:  The patient has been experiencing progressive dyspnea on exertion and was found to have sev
ere symptomatic aortic stenosis.  Cardiac catheterization reveals single-vessel coronary artery disea
se.  He is a low risk patient and is now undergoing surgical valve replacement.





Job #:  [id number]

## 2018-11-12 NOTE — CPEKG
Test Reason : OPEN

Blood Pressure : ***/*** mmHG

Vent. Rate : 076 BPM     Atrial Rate : 319 BPM

   P-R Int : 164 ms          QRS Dur : 089 ms

    QT Int : 399 ms       P-R-T Axes : 093 047 107 degrees

   QTc Int : 449 ms

 

Atrial fibrillation

Probable LVH with secondary repol abnrm

 

Confirmed by Clarence Marx ([id number]) on 11/12/2018 10:58:27 AM

 

Referred By:             Confirmed By:Clarence Marx

## 2018-11-12 NOTE — PDDCSUM
Discharge Summary


Discharge Summary: 





ADMISSION DATE:  


11/8/18





DISCHARGE DATE:  


11/12/18





ADMISSION DIAGNOSES 


1. CAD with severe in-stent restenosis of OM2


2. Severe aortic stenosis 


3. Rate controlled paroxysmal atrial fibrillation 


4. DM type 2 (A1c 6.5%)





DISCHARGE DIAGNOSES


1. CAD with severe in-stent restenosis of OM2


2. Severe aortic stenosis 


3. Rate controlled paroxysmal atrial fibrillation 


4. DM type 2 (A1c 6.5%)


5. Acute blood loss anemia 








PROCEDURES


11/8/18, Regulo Parks:


1. CABGx1 (SVG-OM2), AVR with #23 Magna bioprosthesis, EVH L thigh   





HPI


73M with aortic stenosis and CAD admitted for elective AVR and CABG. 





HOSPITAL COURSE BY PROBLEM LIST 


1. CAD with severe in-stent restenosis of OM2 - s/p CABGx1. Continue ASA/BB/

statin for secondary prevention. 


2. Severe aortic stenosis - stable s/p bioprosthetic AVR. Continue ASA.  


3. Rate controlled paroxysmal atrial fibrillation - managed by Dr. Breaux at 

[facility]. CHADS-VASC score of 3.  Prior to surgery, as per patient and 

family, anticoagulation was never discussed. Patient and family educated 

regarding risk of stroke and have decided to defer decision until after 

surgical recovery. Continue amiodarone (1-month duration) and beta-blocker for 

rate control.    


4. DM type 2 (A1c 6.5%) - Metformin restarted on discharge.   


5. Acute blood loss anemia - stable without the need for transfusions. 





CONDITION


Good





PERTINENT DISCHARGE CLINICAL INFORMATION


Vitals: 118/76, 71 irregularly irregular, 92% on RA, +6kg 


Exam: S1S2, No resp distress, ND, soft, NTP, LE with trace edema BL





DISPOSITION


Home, self-care





ACTIVITY


Pt was instructed on sternal precautions, activity limitations, and which 

problems to call [facility] with. Please see Discharge Plan in chart for 

specifics. 





DISCHARGE MEDICATIONS


Continue:


Multivitamin, ASA, Metformin, Atorvastatin, Mirabegron





New:


Amiodarone (twice daily for  days, then daily until tablet run out), Lasix 40 

mg daily, Metoprolol 12.5 mg BID, Percocet 5/325 (#30) q4h prn, Tylenol prn.  





Stop


Norvasc, Lisinopril 





PENDING STUDIES/LABS


1. CXR prior to surgical follow-up





FOLLOW-UP


1. Regulo Parks, 11/20/18, 9:00 AM

## 2018-11-12 NOTE — ECHO
[email or web address]





[address] [address] 

Main: [phone] 



Fax: 



Transthoracic Echocardiogram 

Name:             SUN CASTRO                            MR#:

[id number]

Study Date:       2018                             Study Time:

10:23 AM

YOB: 1945                             Age:

73 year(s)

Height:           172.7 cm (68 in.)                      Weight:

85.73 kg (189 lb.)

BSA:              2 m2                                   Gender:

Male

Examination:      Echo                                   Indication:

S/P AVR/assess valve and EF

Image Quality:    Good                                   Contrast: 

Requested by:     Lilian Dodge                             BP:

118 mmHg/76 mmHg

Heart Rate:                                              Rhythm: 

Indication:       S/P AVR/assess valve and EF 



Procedure Staff 

Ultrasound Technician:   Tanisha Washington RDCS 

Reading Physician:       Regulo No MD 

Requesting Provider: 



Conclusions:          Normal size left ventricle.  

Normal global systolic LV function.  

The ejection fraction is estimated to be 55-60 %.  

No regional wall motion abnormality.  

Unable to assess diastolic dysfunction due to underlying rhythm.  

The rhythm is atrial fibrillation.  

The left atrium is mildly dilated.  

Mild mitral valve regurgitation is present.  

The aortic valve is a bioprosthesis.  

No prosthesis regurgitation.  

AV max PG is 19mmHG. AV mean PG is 10mmHG..  

Trivial tricuspid valve regurgitation.  

RVSP is 35mmHG.. 



Measurements: 

Chambers                    Valvular Assessment AV/MV

Valvular Assessment TV/PV



Normal                                  Normal

Normal

Name         Value    Range             Name         Value Range

Name          Value Range

Ao Maru (MM): 3.3 cm   (2.2 cm-3.7           AV Vmax:     2.18 m/s (1

m/s-1.7       TR Vmax:      2.76 mm/s ( - )



cm)                                 m/s)             TR PGmax:     30

mmHg ( - )

LVDd (2D):   4.8 cm   (4.2 cm-5.9           AV maxP mmHg ( - )

syst. PAP: 35 mmHg ( - )



cm)               AV meanPG:   10 mmHg ( - )  

LVEF (MOD4): 67 %     (>=55 %)          MV E Vmax:   1.33 m/s ( - )  

EF Range:    55-60 % 



Continued Measurements: 

Chambers                    Valvular Assessment AV/MV

Valvular Assessment TV/PV



Name                    Value           Name                    Value

Name                    Value



Patient: SUN CASTRO                         MRN: [id number]

Study Date: 2018   Page 1 of 2

10:23 AM 









LADs:                    4.2 cm MV E/E' Septal:          17.80    CVP

(est.):             5 mmHg

LADs Lon.9 cm MV E/E' Lateral:         20.40

LA Area:

24.9 cm2

LA Volume:

84 ml

LA Volume Index:

42.0 ml/m2



Findings:             Left Ventricle: 

Normal size left ventricle. No LV hypertrophy. Normal global systolic

LV function. The ejection

fraction is estimated to be 55-60 %. No regional wall motion

abnormality. Unable to assess diastolic

dysfunction due to underlying rhythm. The rhythm is atrial

fibrillation.

Right Ventricle: 

Normal size right ventricle.  

Left Atrium: 

The left atrium is mildly dilated.  

Right Atrium: 

The right atrium is normal in size.  

Mitral Valve: 

The mitral valve is normal in appearance and function. Mild mitral

valve regurgitation is present.

Aortic Valve: 

The aortic valve is a bioprosthesis. No prosthesis regurgitation. AV

max PG is 19mmHG. AV mean

PG is 10mmHG..  

Tricuspid Valve: 

The tricuspid valve is normal in appearance and function. Trivial

tricuspid valve regurgitation. RVSP

is 35mmHG..  

Pulmonic Valve: 

Pulmonary valve not well visualized.  

Aorta: 

The aorta is normal.  

Pericardium: 

No pericardial effusion. 







Electronically signed by Regulo No MD on 2018 at 02:14 PM 

(No Signature Object) 



Patient: SUN CASTRO                         MRN: [id number]

Study Date: 2018   Page 2 of 2

10:23 AM 







D:_[id number]

## 2018-11-12 NOTE — ASDISCHSUM
----------------------------------------------

Discharge Information

----------------------------------------------

Plan Status:Home with No Needs                       Medically Cleared to Leave:11/11/2018

Discharge Date:11/12/2018 01:24 PM                   CM D/C Disposition:Home, Routine, Self-Care

ADT D/C Disposition:Home, Routine, Self-Care         Projected Discharge Date:11/12/2018 01:24 PM

Transportation at D/C:Family                         Discharge Delay Reason:

Follow-Up Date:11/12/2018 01:24 PM                   Discharge Slot:

Final Diagnosis:

----------------------------------------------

Placement Information

----------------------------------------------

----------------------------------------------

Patient Contact Information

----------------------------------------------

Contact Name:GRISEL                         Relationship:Wife

Address:[address] [facility] [phone]

                                                     Work [phone]

City:[place]                                        Alternate Phone:

[facility]/Zip Code:[place] [address]                              Email:

----------------------------------------------

Financial Information

----------------------------------------------

Financial Class:Medicare Advantage Plans

Primary Plan Desc:[employer]         Primary Plan Number:[id number]

Secondary Plan Desc:                                 Secondary Plan Number:

 

 

----------------------------------------------

Assessment Information

----------------------------------------------

----------------------------------------------

LACE

----------------------------------------------

LACE

 

Length of stay for            Answers:  4-6 days                              

current admission                                                             

Acuity / Level of             Answers:  Yes                                   

Care: Did the patient                                                         

have an inpatient                                                             

admission?                                                                    

Comorbidities - select        Answers:  Any tumor (including                  

all that apply                          lymphoma or leukemia)                 

                                        Congestive heart failure              

                                        Coronary Artery Disease               

                                        Diabetes (uncontrolled or             

                                        controlled)                           

                                        Other                         Notes:  HTN

# of Emergency department     Answers:  0                                     

visits in the last 6                                                          

months                                                                        

Score: 15

 

Date Signed:  11/12/2018 02:36 PM

Electronically Signed By:Latosha Davies RN

 

 

----------------------------------------------

[facility] CM Progress Note

----------------------------------------------

CM Note

 

CM Note                       

Notes:

Patient is POD #1 CABG x1 and AVR. He is stable and doing well.



He is normally independent and lives with his wife Fausto. Initial PT eval suggests that he 

will not have any d/c needs. Case Management available if this changes. 

 

Date Signed:  11/09/2018 01:03 PM

Electronically Signed By:Rayne Montalvo RN

 

 

----------------------------------------------

[facility] CM Progress Note

----------------------------------------------

CM Note

 

CM Note                       

Notes:

CM discussed case with RN, patient likely to discharge in 1-2 days independent to home. PT 

recommendation is outpatient rehab and cardiac rehab. CM to follow. 



Current discharge plan: home independent 1-2 days.

 

Date Signed:  11/11/2018 04:51 PM

Electronically Signed By:iKley Albrecht

 

 

----------------------------------------------

Case Management Discharge Plan Note

----------------------------------------------

Case Management Discharge

 

Discharge Order Complete?     Answers:  Yes                                   

Patient to Obtain             Answers:  via Family                            

Medications                                                                   

Transportation Arranged       Answers:  Family/Friends                        

Discharge Comments            

Notes:

11/12/2018 Case Management Note



Pt discharged with family support and outpatient cardiac rehab.

 

Date Signed:  11/12/2018 02:38 PM

Electronically Signed By:Latosha Davies RN

 

 

----------------------------------------------

Intervention Information

----------------------------------------------

Intervention Type:*IM-Signed                         Date of Service:11/12/2018 11:57 AM

Patient Type:Inpatient                               Staff Member:Jeny Iverson

Hours:                                               Discipline:

Severity:                                            Comment:

## 2018-11-12 NOTE — SOAPPROG
SOAP Progress Note


Assessment/Plan: 


POD #4: CABGx1 (SVG-OM2), AVR with #23 Magna bioprosthesis, EVH L thigh   





CAD with severe in-stent restenosis of OM2 s/p bypass with vein graft


- ASA/BB/statin for secondary prevention 





Severe AS s/p AVR with bioprothesis 


- Mgmt as per CABG


- Post-op ECHO this AM 





Acute blood loss anemia 


- Stable without the need for transfusions 





DM 2 (A1c 6.5%)


- Metformin restarted 





h/o PAF


- Continue beta-blocker/amiodarone 


- Appears to be in PAF this AM although could be PACs/PVCs - will obtain 12-

lead EKG 


- Discussed possible need for AC - will d/w Dr. Linares 





Spastic bladder


- Stable





DVT prophylaxis


- SCDs only





Disposition 


- Discharge home today 








Subjective: 


Feels well. Ready to go home.


Objective: 





 Vital Signs











Temp Pulse Resp BP Pulse Ox


 


 36.8 C   76   14   128/71 H  98 


 


 11/12/18 03:35  11/12/18 03:35  11/12/18 03:35  11/12/18 03:35  11/12/18 03:35








 Laboratory Results





 11/12/18 03:22 





 11/12/18 03:22 





 











 11/11/18 11/12/18 11/13/18





 05:59 05:59 05:59


 


Intake Total 1403 1170 


 


Output Total 1495 1975 


 


Balance -92 -805 














Physical Exam





- Physical Exam


General Appearance: WD/WN


EENT: No scleral icterus (R), No scleral icterus (L)


Neck: normal inspection


Respiratory: No respiratory distress


Cardiac/Chest: regular rate, rhythm, extra beats, irregularly irregular (?)


Abdomen: normal bowel sounds, non-tender, soft


Skin: normal color, warm/dry


Extremities: No pedal edema


Neuro/Psych: no motor/sensory deficits, alert, normal mood/affect, oriented x 3





ICD10 Worksheet


Patient Problems: 


 Problems











Problem Status Onset


 


Acute blood loss as cause of postoperative anemia Acute  


 


Aortic stenosis Acute  


 


BPH w urinary obs/LUTS Acute  


 


Coronary stent restenosis Acute  


 


S/P coronary artery bypass graft x 1 Acute

## 2019-01-08 ENCOUNTER — HOSPITAL ENCOUNTER (OUTPATIENT)
Dept: HOSPITAL 80 - FLAB | Age: 74
End: 2019-01-08
Attending: INTERNAL MEDICINE
Payer: COMMERCIAL

## 2019-01-08 DIAGNOSIS — R06.02: Primary | ICD-10-CM

## 2019-01-08 DIAGNOSIS — I10: ICD-10-CM

## 2019-01-08 DIAGNOSIS — J90: ICD-10-CM

## 2019-01-08 DIAGNOSIS — Z98.890: ICD-10-CM

## 2019-01-08 DIAGNOSIS — Z95.5: ICD-10-CM

## 2019-01-24 ENCOUNTER — HOSPITAL ENCOUNTER (OUTPATIENT)
Dept: HOSPITAL 80 - FIMAGING | Age: 74
End: 2019-01-24
Attending: THORACIC SURGERY (CARDIOTHORACIC VASCULAR SURGERY)
Payer: COMMERCIAL

## 2019-01-24 DIAGNOSIS — R91.8: ICD-10-CM

## 2019-01-24 DIAGNOSIS — Z98.890: ICD-10-CM

## 2019-01-24 DIAGNOSIS — J90: Primary | ICD-10-CM

## 2019-01-24 DIAGNOSIS — I31.3: ICD-10-CM

## 2019-01-24 DIAGNOSIS — M47.816: ICD-10-CM

## 2019-01-29 ENCOUNTER — HOSPITAL ENCOUNTER (OUTPATIENT)
Dept: HOSPITAL 80 - FIMAGING | Age: 74
End: 2019-01-29
Attending: THORACIC SURGERY (CARDIOTHORACIC VASCULAR SURGERY)
Payer: COMMERCIAL

## 2019-01-29 DIAGNOSIS — J90: Primary | ICD-10-CM

## 2019-01-29 PROCEDURE — 0W993ZZ DRAINAGE OF RIGHT PLEURAL CAVITY, PERCUTANEOUS APPROACH: ICD-10-PCS | Performed by: RADIOLOGY

## 2019-02-05 ENCOUNTER — HOSPITAL ENCOUNTER (OUTPATIENT)
Dept: HOSPITAL 80 - FIMAGING | Age: 74
End: 2019-02-05
Attending: THORACIC SURGERY (CARDIOTHORACIC VASCULAR SURGERY)
Payer: COMMERCIAL

## 2019-02-05 ENCOUNTER — HOSPITAL ENCOUNTER (OUTPATIENT)
Dept: HOSPITAL 80 - FIMAGING | Age: 74
End: 2019-02-05
Attending: PHYSICIAN ASSISTANT
Payer: COMMERCIAL

## 2019-02-05 DIAGNOSIS — J90: Primary | ICD-10-CM

## 2019-02-05 DIAGNOSIS — Z98.890: ICD-10-CM

## 2019-02-05 PROCEDURE — 0W993ZZ DRAINAGE OF RIGHT PLEURAL CAVITY, PERCUTANEOUS APPROACH: ICD-10-PCS | Performed by: RADIOLOGY

## 2019-02-12 ENCOUNTER — HOSPITAL ENCOUNTER (OUTPATIENT)
Dept: HOSPITAL 80 - FIMAGING | Age: 74
End: 2019-02-12
Attending: THORACIC SURGERY (CARDIOTHORACIC VASCULAR SURGERY)
Payer: COMMERCIAL

## 2019-02-12 DIAGNOSIS — J90: Primary | ICD-10-CM

## 2019-02-13 ENCOUNTER — HOSPITAL ENCOUNTER (INPATIENT)
Dept: HOSPITAL 80 - F3E | Age: 74
LOS: 3 days | Discharge: HOME | DRG: 167 | End: 2019-02-16
Attending: THORACIC SURGERY (CARDIOTHORACIC VASCULAR SURGERY) | Admitting: THORACIC SURGERY (CARDIOTHORACIC VASCULAR SURGERY)
Payer: COMMERCIAL

## 2019-02-13 DIAGNOSIS — Z95.1: ICD-10-CM

## 2019-02-13 DIAGNOSIS — J90: Primary | ICD-10-CM

## 2019-02-13 DIAGNOSIS — I25.10: ICD-10-CM

## 2019-02-13 DIAGNOSIS — Z79.01: ICD-10-CM

## 2019-02-13 DIAGNOSIS — Z95.2: ICD-10-CM

## 2019-02-13 DIAGNOSIS — D62: ICD-10-CM

## 2019-02-13 DIAGNOSIS — I48.0: ICD-10-CM

## 2019-02-13 RX ADMIN — OXYCODONE HYDROCHLORIDE AND ACETAMINOPHEN PRN TAB: 5; 325 TABLET ORAL at 20:44

## 2019-02-13 RX ADMIN — INSULIN LISPRO SCH: 100 INJECTION, SOLUTION INTRAVENOUS; SUBCUTANEOUS at 19:06

## 2019-02-13 NOTE — PDANEPAE
ANE History of Present Illness





thoracoscopy and pleurodesis for pleural effusion





ANE Past Medical History





- Cardiovascular History


Hx Hypertension: Yes


Hx Arrhythmias: No


Hx Chest Pain: No


Hx Coronary Artery / Peripheral Vascular Disease: Yes


Hx CHF / Valvular Disease: Yes


Hx Palpitations: No


Cardiovascular History Comment: HTN.  CAD.  HYPERCHOLESTEROLEMIA.  AVR/ CABG X1 

WITH OHAIR 11/08/18.  FOLLOWED BY [facility]





- Pulmonary History


Hx COPD: No


Hx Asthma/Reactive Airway Disease: No


Hx Recent Upper Respiratory Infection: Yes


Hx Oxygen in Use at Home: No


Hx Sleep Apnea: No


Sleep Apnea Screening Result - Last Documented: Positive


Pulmonary History Comment: MOUNA TRIGGERS.  THORACENTESIS 01/25/19 AND 02/05/19.  

HX OF PLEURAL EFFUSIONS POST OP.  





- Neurologic History


Hx Cerebrovascular Accident: No


Hx Seizures: No


Hx Dementia: No





- Endocrine History


Hx Diabetes: No


Endocrine History Comment: TYPE 2





- Renal History


Hx Renal Disorders: Yes


Renal History Comment: BPH.  FREQUENCY.  HX OF TURP





- Liver History


Hx Hepatic Disorders: No





- Neurological & Psychiatric Hx


Hx Neurological and Psychiatric Disorders: No





- Cancer History


Hx Cancer: Yes


Cancer History Comment: BEING MONITORED CURRENTLY FOR PROSTATE CA WITH DAVE.  

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





- Congenital Disorder History


Hx Congenital Disorders: No





- GI History


Hx Gastrointestinal Disorders: No





- Other Health History


Other Health History: WEAR GLASSES





- Chronic Pain History


Chronic Pain: No





- Surgical History


Prior Surgeries: 02/05/19 THORACENTESIS.  01/29/19 THORACENTESIS.  11/08/18 AVR

/ CABG X1 WITH OHAIR.  10/31/18 CATH WITH [facility].  TURP WITH DAVE 01/12/17.  

ANGIOPLASTY X2 2000.  LUMBAR LAMINECTOMY.  PROSTATE PROCEDURE.  RT WRIST ORIF.  

ANKLE.  T&A





ANE Review of Systems


Review of Systems: 








- Exercise capacity


METS (RN): 3 METS





ANE Patient History





- Allergies


Allergies/Adverse Reactions: 








clindamycin Allergy (Verified 02/12/19 18:06)


 Rash


codeine Allergy (Verified 02/12/19 18:06)


 Rash








- Home Medications


Home medications: home medication list seen and reviewed


Home Medications: 








Multivitamins [Multivitamin (*)]  01/02/17 [Last Taken 02/12/19]


metFORMIN HCL [Glucophage 500 mg (*)]  01/02/17 [Last Taken 02/12/19]


Mirabegron [Myrbetriq]  10/30/18 [Last Taken 02/12/19]


Acetaminophen [Tylenol 325mg (*)]  02/12/19 [Last Taken 11/29/18]


Colchicine  02/12/19 [Last Taken 02/12/19]


Eliquis  02/12/19 [Last Taken 02/12/19]


Furosemide [Lasix 40 MG (*)]  02/12/19 [Last Taken 02/12/19]


Irbesartan  02/12/19 [Last Taken 02/12/19]


Metoprolol Tartrate [Lopressor 25 mg (*)]  02/12/19 [Last Taken 02/12/19]


Procardia Xl  02/12/19 [Last Taken 02/12/19]








- NPO status


NPO Status: no food or drink >8 hours


NPO Since - Liquids (Date): 02/12/19


NPO Since - Liquids (Time): 22:00


NPO Since - Solids (Date): 02/12/19


NPO Since - Solids (Time): 22:00





- Anes Hx


Hx Anesthesia Complications (with details): slighty hoarse after CABG/AVR





- Smoking Hx


Smoking Status: Never smoked





- Alcohol Use


Alcohol Use: Rarely





- Family Anes Hx


Family Anes Hx: none


Family Hx Anesthesia Complications: NONE





ANE Labs/Vital Signs





- Vital Signs


Blood Pressure: 152/81


Heart Rate: 83


Respiratory Rate: 20


O2 Sat (%): 94


Height: 173 cm


Weight: 78.5 kg





ANE Physical Exam





- Airway


Neck exam: FROM


Mallampati Score: Class 2


Mouth exam: poor dentition





- Pulmonary


Pulmonary: no respiratory distress





- Cardiovascular


Cardiovascular: irregularly irregular





- ASA Status


ASA Status: III





ANE Anesthesia Plan


Anesthesia Plan: general endotracheal anesthesia


Specialized Airway: double lumen tube


Urgent/Emergent Case: Sherie cole completed preop but documented later for safe 

timely pt care

## 2019-02-13 NOTE — GOP
[f rep st]



                                                                OPERATIVE REPORT





DATE OF OPERATION:  02/13/2019



SURGEON:  Regulo Linares MD



ASSISTANT:  Cristiano Cagle P.A.-C.



PREOPERATIVE DIAGNOSIS:  Recurrent right pleural effusion.



POSTOPERATIVE DIAGNOSIS:  Recurrent right pleural effusion.



PROCEDURE PERFORMED:  Right thoracoscopy with drainage of effusion and talc pleurodesis.



FINDINGS:  





INDICATIONS:  This patient had undergone aortic valve replacement and single-vessel bypass approximat
ely 14 weeks ago.  On his 1st postoperative chest x-ray, he had a very small right pleural effusion, 
and this was essentially deemed to be not clinically significant.  However, the patient developed an 
upper respiratory infection after discharge and then shortness of breath and a chest x-ray which show
ed a large right pleural effusion.  He has undergone thoracentesis twice, each time draining 1.4-1.6 
L.  This has been rapidly reaccumulating and therefore he is taken to the operating room now for thor
acoscopy and definitive treatment of his effusion.



DESCRIPTION OF PROCEDURE:  The patient was taken to the operating room and placed on the operating ta
ble in the supine position.  After induction of general anesthesia and double-lumen endotracheal tube
 intubation, the patient was positioned in the left lateral decubitus position.  The right chest was 
prepped and draped sterilely.  Two ports were then created, 1 anteriorly and 1 posteriorly in approxi
mately the 10th interspace.  We drained 1.2 L of fluid from the chest.  This was clear but somewhat b
lood-tinged.  It was sent for cytology.  Next, the inspection of the pleura just revealed some mild i
nflammation but no evidence of any tumor or any other abnormalities.  We then instilled 9 g of aeroso
lized talc and placed a 28-Austrian chest tube through 1 of the port sites positioning it at the apex a
nd it was sewn into place.  The lung was then ventilated, the ports were removed, and the wounds were
 closed in layers with Vicryl suture.  The patient tolerated this well and was returned to the [facility] in stable condition.





Job #:  [id number]

## 2019-02-13 NOTE — POSTANESTH
Post Anesthetic Evaluation


Cardiovascular Status: Normal, Stable


Respiratory Status: Normal, Stable, Tx Decrease in SpO2


Pain Control: Adequate, Prn Tx Ordered


Nausea/Vomiting Control: Adequate, Prn Tx Ordered


Complications Possibly Related to Anesthesia: None Noted

## 2019-02-13 NOTE — PDMN
Medical Necessity


Medical necessity: Pt meets inpt criteria per MD order and [facility] S-1082, 

Thoracotomy with Biopsy or Miscellaneous Procedures by Video-Assisted Thoracic 

Surgery (VATS), A-2 days,  IP only list. 74 y/o w/recurrent pleural effusion s

/p AVR, CABGx1 in November, admitted for R VATS drainage of effusion, talc 

pleurodesis and post-op care.

## 2019-02-13 NOTE — POSTOPPROG
Post Op Note


Date of Operation: 02/13/19


Surgeon: Regulo Linares


Assistant: Gurjit 


Anesthesiologist: Donavan Lopez


Anesthesia: GET(General Endotracheal)


Pre-op Diagnosis: recurrent pleural effusion s/p AVR, CABGx1 in November


Post-op Diagnosis: same


Procedure: right VATS drainage of effusion, talc pleurodesis 


Findings: see OR report


Inf/Abcess present in the surg proc area at time of surgery?: No


Depth: Organ Space


EBL: 


Complications: 





none


Drains: Other (chest tube to -20 suction at all times. do not disconnect to 

ambulate)


Specimen(s): 





effusion sent for cytology

## 2019-02-14 LAB — PLATELET # BLD: 199 10^3/UL (ref 150–400)

## 2019-02-14 RX ADMIN — Medication SCH MLS: at 00:37

## 2019-02-14 RX ADMIN — OXYCODONE HYDROCHLORIDE AND ACETAMINOPHEN PRN TAB: 5; 325 TABLET ORAL at 18:27

## 2019-02-14 RX ADMIN — OXYCODONE HYDROCHLORIDE AND ACETAMINOPHEN PRN TAB: 5; 325 TABLET ORAL at 06:28

## 2019-02-14 RX ADMIN — INSULIN LISPRO SCH: 100 INJECTION, SOLUTION INTRAVENOUS; SUBCUTANEOUS at 18:19

## 2019-02-14 RX ADMIN — INSULIN LISPRO SCH: 100 INJECTION, SOLUTION INTRAVENOUS; SUBCUTANEOUS at 15:47

## 2019-02-14 RX ADMIN — Medication SCH MLS: at 06:29

## 2019-02-14 RX ADMIN — INSULIN LISPRO SCH: 100 INJECTION, SOLUTION INTRAVENOUS; SUBCUTANEOUS at 08:15

## 2019-02-14 RX ADMIN — IRBESARTAN SCH MG: 150 TABLET ORAL at 09:10

## 2019-02-14 RX ADMIN — METOPROLOL TARTRATE SCH MG: 100 TABLET, FILM COATED ORAL at 18:27

## 2019-02-14 RX ADMIN — IRBESARTAN SCH MG: 150 TABLET ORAL at 22:19

## 2019-02-14 NOTE — SOAPPROG
SOAP Progress Note


Assessment/Plan: 


POD #1: right thoracoscopy with drainage of effusion and talc pleurodesis 


11/8/18 ([facility]): CABGx1 (SVG-OM2), AVR with #23 Magna bioprosthesis, EVH L 

thigh   





Recurrent right pleural effusion s/p drainage/talc pleurodesis 


- CT to remain on suction at all times for 48-72 hours


- No NSAIDS 


- Cytology of fluid pending 





Post-op PAF with h/o PAF 


- Continue BB


- Amiodarone if RVR


- Eliquis on hold as per Dr. JOEL Calvert 





Acute post-op blood loss anemia 


- Stable w/o the need for transfusions 





h/o CAD with severe in-stent restenosis of OM2 s/p CABGx1


- ASA held


- BB/statin for secondary prevention. 





h/o AS s/p bioprosthetic AVR


- Stable





DVT prophylaxis 


- SCDs/Lovenox 








Subjective: 


Denies pain/SOB. 


Objective: 





 Vital Signs











Temp Pulse Resp BP Pulse Ox


 


 36.6 C   84   16   143/85 H  97 


 


 02/14/19 04:00  02/14/19 04:00  02/14/19 04:00  02/14/19 04:00  02/14/19 04:00








 Laboratory Results





 02/14/19 03:40 





 02/14/19 03:40 





 











 02/13/19 02/14/19 02/15/19





 05:59 05:59 05:59


 


Intake Total  2099 


 


Output Total  618 


 


Balance  1481 














Physical Exam





- Physical Exam


General Appearance: WD/WN, alert, no apparent distress


EENT: No scleral icterus (R), No scleral icterus (L)


Neck: normal inspection


Respiratory: other (no air leak from CT), No respiratory distress


Cardiac/Chest: regular rate, rhythm


Abdomen: non-tender, soft, No distended


Skin: normal color, warm/dry


Extremities: No pedal edema


Neuro/Psych: no motor/sensory deficits, alert, normal mood/affect, oriented x 3





ICD10 Worksheet


Patient Problems: 


 Problems











Problem Status Onset


 


Recurrent pleural effusion on right Acute  


 


Acute blood loss as cause of postoperative anemia Acute  


 


Aortic stenosis Acute  


 


BPH w urinary obs/LUTS Acute  


 


Coronary stent restenosis Acute  


 


S/P coronary artery bypass graft x 1 Acute  


 


Paroxysmal atrial fibrillation Chronic

## 2019-02-14 NOTE — ASMTCMCOM
CM Note

 

CM Note                       

Notes:

2/14/2019 Case Management Note



Discussed with Hernandez Abel this morning.  Discussed with RN.  Pt s/p CABG with recent transfer to 

PCU.  Awaiting therapy evals.



Case Management d/c poc:  to be determined.



Case Management to follow.



 

 

Date Signed:  02/14/2019 12:53 PM

Electronically Signed By:Latosha Davies RN

## 2019-02-15 RX ADMIN — IRBESARTAN SCH MG: 150 TABLET ORAL at 19:51

## 2019-02-15 RX ADMIN — APIXABAN SCH MG: 5 TABLET, FILM COATED ORAL at 09:07

## 2019-02-15 RX ADMIN — THERA TABS SCH EACH: TAB at 09:05

## 2019-02-15 RX ADMIN — DOCUSATE SODIUM AND SENNOSIDES SCH: 50; 8.6 TABLET ORAL at 19:52

## 2019-02-15 RX ADMIN — IRBESARTAN SCH MG: 150 TABLET ORAL at 09:07

## 2019-02-15 RX ADMIN — APIXABAN SCH MG: 5 TABLET, FILM COATED ORAL at 19:51

## 2019-02-15 RX ADMIN — OXYCODONE HYDROCHLORIDE AND ACETAMINOPHEN PRN TAB: 5; 325 TABLET ORAL at 05:43

## 2019-02-15 RX ADMIN — METOPROLOL TARTRATE SCH MG: 100 TABLET, FILM COATED ORAL at 09:06

## 2019-02-15 RX ADMIN — METOPROLOL TARTRATE SCH MG: 100 TABLET, FILM COATED ORAL at 19:50

## 2019-02-15 RX ADMIN — DILTIAZEM HYDROCHLORIDE SCH MG: 120 CAPSULE, EXTENDED RELEASE ORAL at 10:58

## 2019-02-15 RX ADMIN — DOCUSATE SODIUM AND SENNOSIDES SCH TAB: 50; 8.6 TABLET ORAL at 09:05

## 2019-02-15 NOTE — SOAPPROG
SOAP Progress Note


Assessment/Plan: 


POD #2: right thoracoscopy with drainage of effusion and talc pleurodesis 


11/8/18 ([facility]): CABGx1 (SVG-OM2), AVR with #23 Magna bioprosthesis, EVH L 

thigh   





Recurrent right pleural effusion s/p drainage/talc pleurodesis 


- Continue CT - OK to ambulate off suction 


- Cytology of fluid pending 





Post-op PAF with h/o PAF 


- Continue BB/CCB


- Amiodarone if RVR


- Eliquis for thromboprophylaxis to be restarted today





Acute post-op blood loss anemia 


- Stable w/o the need for transfusions 





h/o CAD with severe in-stent restenosis of OM2 s/p CABGx1


- Not on ASA pre-op 


- BB/statin for secondary prevention. 





h/o AS s/p bioprosthetic AVR


- Stable





DVT prophylaxis 


- SCDs/Eliquis











Subjective: 


Denies pain/SOB. 


Objective: 





 Vital Signs











Temp Pulse Resp BP Pulse Ox


 


 36.8 C   74   16   145/80 H  97 


 


 02/15/19 04:00  02/15/19 04:00  02/15/19 04:00  02/15/19 04:00  02/15/19 04:00








 Laboratory Results





 02/14/19 03:40 





 02/14/19 03:40 





 











 02/14/19 02/15/19 02/16/19





 05:59 05:59 05:59


 


Intake Total 2099 750 225


 


Output Total 868 215 


 


Balance 1231 535 225














Physical Exam





- Physical Exam


General Appearance: WD/WN, alert, no apparent distress


EENT: No scleral icterus (R), No scleral icterus (L)


Neck: normal inspection


Respiratory: No respiratory distress


Cardiac/Chest: regular rate, rhythm


Abdomen: non-tender, soft, No distended


Skin: normal color, warm/dry


Extremities: No pedal edema


Neuro/Psych: no motor/sensory deficits, alert, normal mood/affect, oriented x 3





ICD10 Worksheet


Patient Problems: 


 Problems











Problem Status Onset


 


Recurrent pleural effusion on right Acute  


 


Acute blood loss as cause of postoperative anemia Acute  


 


Aortic stenosis Acute  


 


BPH w urinary obs/LUTS Acute  


 


Coronary stent restenosis Acute  


 


S/P coronary artery bypass graft x 1 Acute  


 


Paroxysmal atrial fibrillation Chronic

## 2019-02-16 VITALS — DIASTOLIC BLOOD PRESSURE: 69 MMHG | SYSTOLIC BLOOD PRESSURE: 134 MMHG

## 2019-02-16 RX ADMIN — APIXABAN SCH MG: 5 TABLET, FILM COATED ORAL at 09:22

## 2019-02-16 RX ADMIN — IRBESARTAN SCH MG: 150 TABLET ORAL at 09:22

## 2019-02-16 RX ADMIN — METOPROLOL TARTRATE SCH MG: 100 TABLET, FILM COATED ORAL at 09:22

## 2019-02-16 RX ADMIN — DILTIAZEM HYDROCHLORIDE SCH MG: 120 CAPSULE, EXTENDED RELEASE ORAL at 09:22

## 2019-02-16 RX ADMIN — THERA TABS SCH EACH: TAB at 09:22

## 2019-02-16 RX ADMIN — DOCUSATE SODIUM AND SENNOSIDES SCH TAB: 50; 8.6 TABLET ORAL at 09:22

## 2019-02-16 NOTE — SOAPPROG
SOAP Progress Note


Assessment/Plan: 


Assessment: POD#3 right thoracoscopy with drainage of effusion and talc 

pleurodesis 


11/8/18 ([facility]): CABGx1 (SVG-OM2), AVR with #23 Magna bioprosthesis, EVH L 

thigh   





Recurrent right pleural effusion s/p drainage/talc pleurodesis 


- Lung remains well expanded without air leak


- Cytology neg for malignancy 





Acute post-op blood loss anemia 


- Stable w/o the need for transfusions


- DVT prophylaxis w SCDs/Eliquis


 


Hx PAF/chronic anticoagulation on Eliquis


- Stable. Intermittent post VATS AF w VVR. Improved rate control with 

resumption of BB/CCB.


- Amiodarone reserved for sustained RVR


- Eliquis resumed 





Stable CAD s/p recent CABG


- Not on ASA pre-op. Anti-inflammatories avoided post pleurodesis.


- Secondary prevention with BB/statin.


- Wounds well healed 





Presence bioprosthetic aortic valve


- Stable


- Antithrombotic prophylaxis as per rhythm














Plan:


Remove chest tube.


Increase mobility and pulm toilet.


Dispo - Ok for home later today if cont to feel well.





02/16/19 08:07














Subjective: 





Feels well. Tolerating light activity with relative ease. Nervous about fluid 

reaccumulating once tube out (and wrecking his joselyn plans to [place]). 


Objective: 





 Vital Signs











Temp Pulse Resp BP Pulse Ox


 


 36.8 C   68   16   126/77 H  96 


 


 02/16/19 06:04  02/16/19 06:04  02/16/19 06:04  02/16/19 06:04  02/16/19 06:04








 Laboratory Results





 02/14/19 03:40 





 02/14/19 03:40 





 











 02/15/19 02/16/19 02/17/19





 05:59 05:59 05:59


 


Intake Total 750 1375 


 


Output Total 215 1230 


 


Balance 535 145 








VSS


Borderline suppl O2 req


CTOP down to 20 cc/last shift


CXR -> no PTX, unchanged left basilar atelectasis





Physical Exam





- Physical Exam


General Appearance: alert, no apparent distress


Respiratory: crackles (left base, o/w CTA), other (chest tube to pleurovac, 

serous drainage, no tidal, no inducible air leak)


Cardiac/Chest: regular rate, rhythm, other (Sternum stable. Sternotomy well 

healed scar.)


Abdomen: non-tender, soft





ICD10 Worksheet


Patient Problems: 


 Problems











Problem Status Onset


 


Acute blood loss as cause of postoperative anemia Acute  


 


Aortic stenosis Acute  


 


BPH w urinary obs/LUTS Acute  


 


Coronary stent restenosis Acute  


 


Recurrent pleural effusion on right Acute  


 


S/P coronary artery bypass graft x 1 Acute  


 


Paroxysmal atrial fibrillation Chronic

## 2019-02-16 NOTE — ASMTLACE
LACE

 

Length of stay for            Answers:  3 days                                

current admission                                                             

Acuity / Level of             Answers:  Yes                                   

Care: Did the patient                                                         

have an inpatient                                                             

admission?                                                                    

Comorbidities - select        Answers:  Any tumor (including                  

all that apply                          lymphoma or leukemia)                 

                                        Congestive heart failure              

                                        Coronary Artery Disease               

                                        Diabetes (uncontrolled or             

                                        controlled)                           

                                        Other                         Notes:  HTN

# of Emergency department     Answers:  0                                     

visits in the last 6                                                          

months                                                                        

Score: 14

 

Date Signed:  02/16/2019 02:31 PM

Electronically Signed By:EMMANUEL Kapoor

## 2019-02-16 NOTE — ASMTDCNOTE
Case Management Discharge

 

Discharge Order Complete?     Answers:  Yes                                   

Patient to Obtain             Answers:  via Family                            

Medications                                                                   

Transportation Arranged       Answers:  Family/Friends                        

Family Notified               Answers:  Yes                                   

Discharge Comments            

Notes:

Pt is being discharged independently, no CM needs identified. Family to transport. 

 

Date Signed:  02/16/2019 02:31 PM

Electronically Signed By:EMMANUEL Kapoor

## 2019-02-16 NOTE — PDDCSUM
Discharge Summary


Discharge Summary: 





DATE OF ADMISSION: 19





DATE OF DISCHARGE: 19





DISPOSITION: Home self-care





PRINCIPAL ADMISSION DIAGNOSIS: Recurrent right pleural effusion





PRINCIPAL DISCHARGE DIAGNOSIS: Status post thoracoscopic drainage of effusion 

with talc pleurodesis





HISTORY OF PRESENT ILLNESS:


74 yo male with recovery from open heart surgery complicated by recurrent right 

pleural effusion refractory to thoracenteses, admitted for elective VATS 

drainage with pleurodesis.





PERTINENT PAST MEDICAL HISTORY:


CABG x 1 (SV-OM2), AVR#23 Magna bioprosthesis 18 by Dr Parks


1400 ml rt thoracentesis 18


1800 ml rt thoracentesis 18





MEDICATIONS ON ADMISSION:


MVI daily, Metformin 500mg BID, Procardia XL 30 mg daily, Irbesartan 150 mg BID

, Lasix 20 mg daily, Metoprolol tartrate 100 mg BID, Colchicine 0.6 mg BID, 

Mirabegron 25 mg daily, Tramadol 50 mg q6h prn, Tylenol 650 mg q6h prn, 

Proventil MDI 1-2 puffs q 4h prn





ALLERGIES/SENSITIVITIES:


Clindamycin causing a rash, codeine causing a rash





CONSULTANTS: none





PROCEDURES/IMAGIN/13 ([facility]): Right thoracoscopic drainage of 2 liter effusion. Talc 

pleurodesis.





ABBREVIATED HOSPITAL COURSE BY ACTIVE PROBLEM LIST:


1. Recurrent right pleural effusion - Amenable to VATS drainage and talc 

pleurodesis. Full lung expansion restored. Cytology negative for malignancy. 

Anti-inflammatories avoided to facilitate scarring between visceral and 

parietal pleura.


2. Hx PAF/chronic anticoagulation on Eliquis - Stable. No sustained RVR. 

Overall rate control improved with resumption of BB/CCB. Eliquis resumed for 

[id number] score of 4.


3. Stable CAD and wound healing s/p recent CABG - Secondary prevention with BB 

and statin. Not on ASA pre-op and anti-inflammatories avoided post pleurodesis. 

To resume phase 2 cardiac rehab next week. 


4. Presence bioprosthetic aortic valve - Antithrombotic prophylaxis as per 

rhythm.





DISCHARGE CLINICAL INFORMATION:


RVATs port CDI.


HR 60s-70s.  SBP 120s-130s. SpO2 92% RA. 


WBC 8.7, Hgb 12.5, HCT 39.6, Plt 199, Na 134, K 4.2, Cr 0.8, FSBG 110s-160s.





DISCHARGE MEDICATIONS:


As on admission with the following adjustments:


1. Hold Procardia


2. Hold Colchicine


3. Change Lasix to prn overnight weight gain > 2 lbs or increased leg edema


NEW prescriptions:


1. Cardizem  mg daily





FOLLOW UP APPOINTMENTS:


1. CV surgery: with Dr Linares at [facility] on  if needed.


2. Cardiology: with Dr Andrade at [facility] on  as directed. 





FOLLOW UP TESTING:


CXR on .

## 2019-02-16 NOTE — ASDISCHSUM
----------------------------------------------

Discharge Information

----------------------------------------------

Plan Status:Home with No Needs                       Medically Cleared to Leave:

Discharge Date:                                      CM D/C Disposition:Home, Routine, Self-Care

ADT D/C Disposition:Home, Routine, Self-Care         Projected Discharge Date:

Transportation at D/C:Family                         Discharge Delay Reason:

Follow-Up Date:                                      Discharge Slot:

Final Diagnosis:

----------------------------------------------

Placement Information

----------------------------------------------

----------------------------------------------

Patient Contact Information

----------------------------------------------

Contact Name:GRISEL                         Relationship:Wife

Address:[address] [place] [address] [phone]

                                                     Work [phone]

City: Phone:

State/Zip Code:[place] [address]                              Email:

----------------------------------------------

Financial Information

----------------------------------------------

Financial Class:Medicare Advantage Plans

Primary Plan Desc:[employer]         Primary Plan Number:[id number]

Secondary Plan Desc:                                 Secondary Plan Number:

 

 

----------------------------------------------

Assessment Information

----------------------------------------------

----------------------------------------------

LACE

----------------------------------------------

LACE

 

Length of stay for            Answers:  3 days                                

current admission                                                             

Acuity / Level of             Answers:  Yes                                   

Care: Did the patient                                                         

have an inpatient                                                             

admission?                                                                    

Comorbidities - select        Answers:  Any tumor (including                  

all that apply                          lymphoma or leukemia)                 

                                        Congestive heart failure              

                                        Coronary Artery Disease               

                                        Diabetes (uncontrolled or             

                                        controlled)                           

                                        Other                         Notes:  HTN

# of Emergency department     Answers:  0                                     

visits in the last 6                                                          

months                                                                        

Score: 14

 

Date Signed:  02/16/2019 02:31 PM

Electronically Signed By:EMMANUEL Kapoor

 

 

----------------------------------------------

[facility] CM Progress Note

----------------------------------------------

CM Note

 

CM Note                       

Notes:

2/14/2019 Case Management Note



Discussed with Hernandez Abel this morning.  Discussed with RN.  Pt s/p CABG with recent transfer to 

PCU.  Awaiting therapy evals.



Case Management d/c poc:  to be determined.



Case Management to follow.



 

 

Date Signed:  02/14/2019 12:53 PM

Electronically Signed By:Latosha Davies RN

 

 

----------------------------------------------

Case Management Discharge Plan Note

----------------------------------------------

Case Management Discharge

 

Discharge Order Complete?     Answers:  Yes                                   

Patient to Obtain             Answers:  via Family                            

Medications                                                                   

Transportation Arranged       Answers:  Family/Friends                        

Family Notified               Answers:  Yes                                   

Discharge Comments            

Notes:

Pt is being discharged independently, no CM needs identified. Family to transport. 

 

Date Signed:  02/16/2019 02:31 PM

Electronically Signed By:EMMANUEL Kapoor

 

 

----------------------------------------------

Intervention Information

----------------------------------------------

## 2019-02-20 ENCOUNTER — HOSPITAL ENCOUNTER (OUTPATIENT)
Dept: HOSPITAL 80 - FIMAGING | Age: 74
End: 2019-02-20
Attending: GENERAL ACUTE CARE HOSPITAL
Payer: COMMERCIAL

## 2019-02-20 DIAGNOSIS — Z09: Primary | ICD-10-CM

## 2019-02-20 DIAGNOSIS — R91.8: ICD-10-CM

## 2019-02-20 DIAGNOSIS — I51.7: ICD-10-CM

## 2019-02-20 DIAGNOSIS — Z98.890: ICD-10-CM

## 2019-02-23 ENCOUNTER — HOSPITAL ENCOUNTER (OUTPATIENT)
Dept: HOSPITAL 80 - FED | Age: 74
Setting detail: OBSERVATION
LOS: 2 days | Discharge: HOME | End: 2019-02-25
Attending: INTERNAL MEDICINE | Admitting: INTERNAL MEDICINE
Payer: COMMERCIAL

## 2019-02-23 DIAGNOSIS — J91.8: ICD-10-CM

## 2019-02-23 DIAGNOSIS — I48.0: ICD-10-CM

## 2019-02-23 DIAGNOSIS — I97.0: ICD-10-CM

## 2019-02-23 DIAGNOSIS — J20.8: ICD-10-CM

## 2019-02-23 DIAGNOSIS — Z95.1: ICD-10-CM

## 2019-02-23 DIAGNOSIS — Z95.3: ICD-10-CM

## 2019-02-23 DIAGNOSIS — Z85.46: ICD-10-CM

## 2019-02-23 DIAGNOSIS — J21.8: ICD-10-CM

## 2019-02-23 DIAGNOSIS — J45.909: ICD-10-CM

## 2019-02-23 DIAGNOSIS — I11.9: ICD-10-CM

## 2019-02-23 DIAGNOSIS — Z79.84: ICD-10-CM

## 2019-02-23 DIAGNOSIS — E11.9: ICD-10-CM

## 2019-02-23 DIAGNOSIS — Z79.01: ICD-10-CM

## 2019-02-23 DIAGNOSIS — J96.01: ICD-10-CM

## 2019-02-23 DIAGNOSIS — E86.9: ICD-10-CM

## 2019-02-23 DIAGNOSIS — R01.1: ICD-10-CM

## 2019-02-23 DIAGNOSIS — N40.1: ICD-10-CM

## 2019-02-23 DIAGNOSIS — E78.5: ICD-10-CM

## 2019-02-23 DIAGNOSIS — I25.10: ICD-10-CM

## 2019-02-23 DIAGNOSIS — B34.2: Primary | ICD-10-CM

## 2019-02-23 LAB
INR PPP: 1.58 (ref 0.83–1.16)
PLATELET # BLD: 256 10^3/UL (ref 150–400)
PROTHROMBIN TIME: 19 SEC (ref 12–15)

## 2019-02-23 PROCEDURE — 99291 CRITICAL CARE FIRST HOUR: CPT

## 2019-02-23 PROCEDURE — 71275 CT ANGIOGRAPHY CHEST: CPT

## 2019-02-23 PROCEDURE — 96361 HYDRATE IV INFUSION ADD-ON: CPT

## 2019-02-23 PROCEDURE — 93005 ELECTROCARDIOGRAM TRACING: CPT

## 2019-02-23 PROCEDURE — 93306 TTE W/DOPPLER COMPLETE: CPT

## 2019-02-23 PROCEDURE — G0378 HOSPITAL OBSERVATION PER HR: HCPCS

## 2019-02-23 PROCEDURE — 71046 X-RAY EXAM CHEST 2 VIEWS: CPT

## 2019-02-23 PROCEDURE — 32555 ASPIRATE PLEURA W/ IMAGING: CPT

## 2019-02-23 PROCEDURE — 96365 THER/PROPH/DIAG IV INF INIT: CPT

## 2019-02-23 PROCEDURE — 71045 X-RAY EXAM CHEST 1 VIEW: CPT

## 2019-02-23 RX ADMIN — FUROSEMIDE SCH MG: 20 TABLET ORAL at 19:32

## 2019-02-23 RX ADMIN — IPRATROPIUM BROMIDE AND ALBUTEROL SULFATE SCH ML: .5; 3 SOLUTION RESPIRATORY (INHALATION) at 21:04

## 2019-02-23 RX ADMIN — IRBESARTAN SCH MG: 150 TABLET ORAL at 19:33

## 2019-02-23 RX ADMIN — METOPROLOL TARTRATE SCH MG: 100 TABLET, FILM COATED ORAL at 19:32

## 2019-02-23 NOTE — GHP
[f rep st]



                                                            HISTORY AND PHYSICAL





DATE OF ADMISSION:  02/23/2019



CHIEF COMPLAINT:  Shortness of breath.



HISTORY:  The patient is a 73-year-old male, just discharged from our hospital, February 16th.  He ha
d a bioprosthetic aortic valve replacement for aortic stenosis and a 1-vessel CABG in November 2018, 
performed by Dr. Linares.  More recently, he has been suffering from a recurrent right pleural effusio
n.  He had a couple of thoracenteses, but it continued to accumulate, so he underwent a VATS thorasco
pic drainage with talc pleurodesis and was discharged on February 16th.  He now represents to the [facility] with dyspnea on exertion for the last 3 days.  This is very severe, and he cannot even walk acr
oss the room without gasping for air.  He has noticed some wheezing at home, and he is an asthmatic. 
 He has a new cough that is occasionally productive.  He has some congestion and sore throat.  There 
has been no fever.  The only chest pain is due to the cough.  Him and his wife were scheduled to fly 
to [place] today for a 5-week vacation, but they have canceled that given his poor respiratory status
.



PAST MEDICAL HISTORY:  

1.  Coronary artery disease, status post 1-vessel CABG, November 2018.

2.  Bioprosthetic aortic valve replacement for aortic stenosis by Dr. Linares, November 2019.

3.  Paroxysmal atrial fibrillation.

4.  Diabetes, type 2.

5.  BPH, status post TURP.

6.  Prostate cancer.

7.  Asthma.



PAST SURGICAL HISTORY:  Appendectomy.



MEDICATIONS:  Please see computerized record for a full detailed list.



ALLERGIES:  Clindamycin and codeine.



SOCIAL HISTORY:  No smoking.  No alcohol.  He lives with his wife.



REVIEW OF SYSTEMS:  Complete review of systems was obtained.  Review of systems was negative on const
itutional, HEENT, GI, pulmonary, cardiovascular, , hematology, skin, muscular, endocrine, psych exc
ept for positives and negatives as noted in HPI.



FAMILY HISTORY:  Reviewed, noncontributory to presenting complaint.



PHYSICAL EXAMINATION:  GENERAL:  Well-developed, well-nourished male in no distress.  VITAL SIGNS:  T
emperature 36.5, pulse 72, blood pressure 126/86, satting 94% on room air.  EYES:  Normal conjunctiva
e.  Pupils equal and reactive to light.  ENT:  Normal ears, nose.  Hearing intact.  Normal teeth.  Or
opharynx moist.  NECK:  Trachea midline.  No thyromegaly.  CHEST:  Normal respiratory effort.  Lungs 
clear to auscultation bilaterally.  CARDIOVASCULAR:  Regular rhythm.  No murmur.  No lower extremity 
edema.  ABDOMEN:  Soft, nontender.  No hepatosplenomegaly.  SKIN:  Warm, dry, intact.  No rash.  MUSC
ULOSKELETAL:  No cyanosis or clubbing.  Strength 5/5 upper and lower extremities.  NEURO:  Cranial ne
rves intact.  Normal sensation to light touch.  PSYCH:  Alert, oriented x3.  Normal mood and affect. 
 Normal judgment and insight.  Normal memory.



LABS:  White count 12.68, hematocrit 40.5, platelets 256.  Sodium 139, potassium 4.0, chloride 106, b
icarb 24, BUN 19, creatinine 0.8, glucose 145.  Troponins negative.  Lactate is 2.4.  D-dimer is 2.34
.  INR is 1.58.  EKG viewed by me.  My personal interpretation is normal sinus rhythm, lateral T-wave
 inversions.  Echocardiogram shows an EF of 60%, diastolic dysfunction and normal aortic valve replac
ement.  CT angio of the chest is negative for pulmonary embolus, moderate left-sided effusion.  Pulmo
nary parenchyma shows bronchitis and bronchiolitis.  Respiratory PCR is positive coronavirus.



ASSESSMENT/PLAN:  

1.  Viral bronchitis and bronchiolitis due to coronavirus, possibly secondary to bacterial pneumonia.
  He received Levaquin in the emergency room.  I will change him to azithromycin given the benefit of
 the anti-inflammatory properties in asthmatics.  If there is a bacterial component, I suspect it to 
be relatively atypical.  We will schedule him on nebulizers.

2.  Recurrent pleural effusions, status post assisted thoracoscopic surgery pleurodesis on the right,
 now with increasing fluid on the left.  We will order a thoracentesis and check fluid studies.  On r
eview of old chart, I cannot find the fluid studies that were ever done on the right-sided effusion, 
so it is unclear to me if it was transudative or exudative.  I will also evaluate for conditions, whi
ch may lead to volume overload.  Check a TSH, liver function tests, as well as a urinalysis for prote
in.

3.  Atrial fibrillation.  I will hold his Eliquis for an anticipate thoracentesis during this hospita
lization.  Continue metoprolol and diltiazem.

4.  Coronary artery disease, status post 1-vessel coronary artery bypass grafting, status post biopro
sthetic aortic valve replacement, November 2018.  This CT surgery was relatively distant, so it would
 seem unusual to me that he would still be having complications related to that.  

5.  Asthma.  I currently do not hear any wheezing.  I will hold off on steroid treatment but schedule
 nebulizers.



CODE STATUS:  Full.



ADMISSION STATUS:  We will admit to observation.  Reevaluate tomorrow regarding ongoing need for hosp
italization.



DVT PROPHYLAXIS:  He is low risk given his chronic anticoagulation with Eliquis.





Job #:  [id number]

## 2019-02-23 NOTE — ECHO
[email or web address]





[address] [address] 

Main: [phone] 



Fax: 



Transthoracic Echocardiogram 

Name:             SUN CASTRO                             MR#:

[id number]

Study Date:       2019                              Study Time:

01:23 PM

YOB: 1945                              Age:

73 year(s)

Height:           172.7 cm (68 in.)                       Weight:

80.29 kg (177 lb.)

BSA:              1.94 m2                                 Gender:

Male

Examination:      Echo                                    Indication:

Shortness of breath, Aortic Valve



Regurgitation 

Image Quality:    Fair                                    Contrast: 

Requested by:     Jet Dorantes                           BP:

120 mmHg/75 mmHg

Heart Rate:       66 bpm                                  Rhythm:

Normal sinus rhythm

Indication:       Shortness of breath, Aortic Valve Regurgitation 



Procedure Staff 

Ultrasound Technician:   Blayne Flores RDCS 

Reading Physician:       Felipe Colon MD 

Requesting Provider: 



Conclusions:           1)Normal LV size and systolic function with a

LVEF of 60%.

2)Mild concentric LVH with diastolic dysfunction noted. 

3)Mild left atrial enlargement noted. 

4)Normal functioning bioprosthetic AVR with normal gradient and no AI.



5)Trivial MR without MV prolapse. 

6)Mild TR with estimated normal PA pressures. 

7)Trivial pericardial effusion with no tamponade signs. 



Measurements: 

Chambers                    Valvular Assessment AV/MV

Valvular Assessment TV/PV



Normal                                   Normal

Normal

Name         Value     Range              Name         Value Range

Name           Value Range

IVSd (2D):   0.9 cm (0.6 cm-1.1               AV Vmax:     2.10 m/s (1

m/s-1.7        TR Vmax:       2.78 mm/s ( - )



cm)                                  m/s)              TR PGmax:

31 mmHg ( - )

LVDd (2D):   4.6 cm    (4.2 cm-5.9            AV maxP mmHg ( -

)          syst. PAP: 36 mmHg ( - )



cm)                AV meanPG:   10 mmHg ( - )          PV Vmax:

0.72 m/s (0.6 m/s-0.9

LVDs (2D):   3.3 cm    (2.1 cm-4              LVOT Vmax:   1.22 m/s

(0.7 m/s-1.1                         m/s)



cm)                                  m/s)              PV PGmax:

2  mmHg ( - )

LVPWd (2D):  1.2 cm    (0.6 cm-1              RANDOLPH (Vmax):  2.2 cm2 ( -

)



cm)                RANDOLPH (VTI):   2.5 cm ( - )  

LVOTd        2.2 cm    2.2 cm mm              MV E Vmax:   1.06 m/s (

- )

LVEF (BP):   60 %      (>=55 %)           MV A Vmax:   0.55 m/s ( - )





MV E/A:      1.93 ( - )  



Continued Measurements: 

Chambers                    Valvular Assessment AV/MV

Valvular Assessment TV/PV



Name                     Value            Name

Value      Name                    Value



Patient: SUN CASTRO                          MRN: [id number]

Study Date: 2019   Page 1 of 2

01:23 PM 









LADs Lon.4 cm MV E/E' Septal:          16.70    CVP

(est.):             5 mmHg

LA Area:

16.9 cm2   MV E/E' Lateral: 20.90

LA Volume:

48 ml

LA Volume Index:

24.7 ml/m2



Findings:              Left Ventricle: 

Normal size left ventricle. Mild concentric LV hypertrophy. Normal

global systolic LV function. EF is

60 %. No regional wall motion abnormality. Grade 1 diastolic

dysfunction (abnormal relaxation). The

mid LV outflow gradient is 8 mmHg .  

Right Ventricle: 

Normal size right ventricle. Normal RV function.  

Left Atrium: 

The left atrium is mildly dilated.  

Right Atrium: 

The right atrium is normal in size.  

Mitral Valve: 

Mild mitral valve leaflet calcification is present. Trivial mitral

valve regurgitation.

Aortic Valve: 

The aortic valve is a bioprosthesis. The AV mean PG is 10 mmHg. The

Peak AV Vmax velocity is

2.1m/s.  

Tricuspid Valve: 

The tricuspid valve is normal in appearance and function. Mild

tricuspid regurgitation is present. The

pulmonary artery pressure is normal.  

Pulmonic Valve: 

The pulmonic valve is normal in appearance and function.  

Aorta: 

The aorta is normal.  

Pericardium: 

Trivial pericardial effusion. 







Electronically signed by Felipe Colon MD on 2019 at 02:12 PM 

(No Signature Object) 



Patient: SUN CASTRO                          MRN: [id number]

Study Date: 2019   Page 2 of 2

01:23 PM 







[id number]

## 2019-02-23 NOTE — EDPHY
H & P


Stated Complaint: sob post pleural surg last week


Time Seen by Provider: 02/23/19 11:40


HPI/ROS: 


CHIEF COMPLAINT:  Dyspnea





HISTORY OF PRESENT ILLNESS:  The patient is a 73-year-old man with history of 

single-vessel CABG as well as aortic valve bovine repair in November of last 

year.  Also history of atrial fibrillation and is currently on Eliquis.  His 

postoperative course has been complicated by several pleural effusions.  The 

first 2 were drained by thoracentesis and the 3rd 1 was drained with a chest 

tube and pleurodesis on February 14th.  These were all on the right side.  He 

had been doing well for the last week until yesterday when he noticed some 

dyspnea on mild exertion.  Today he states he can hardly walk across the room 

without getting short of breath.  No fever.  No cough.  No chest pain.  He has 

had a slight sinus congestion and sore throat.


Severity:  Moderate


Modifying factors:  Worsened by exertion





REVIEW OF SYSTEMS:


Constitutional:  denies: chills, fever, recent illness, recent injury


EENTM:  See HPI


Respiratory:  See HPI


Cardiac: denies: chest pain, irregular heart rate, lightheadedness, palpitations


Gastrointestinal/Abdominal: denies: abdominal pain, diarrhea, nausea, vomiting, 

blood streaked stools


Genitourinary: denies: dysuria, frequency, hematuria, pain


Musculoskeletal: denies: joint pain, muscle pain


Skin: denies: lesions, rash, jaundice, bruising


Neurological: denies: headache, numbness, paresthesia, tingling, dizziness, 

weakness


Hematologic/Lymphatic: denies: blood clots, easy bleeding, easy bruising


Immunologic/allergic: denies: HIV/AIDS, transplant


 10 systems reviewed and negative except as noted





EXAM:


GENERAL:  Well-appearing, well-nourished and in no acute distress.


HEAD:  Atraumatic, normocephalic.


EYES:  Pupils equal round and reactive to light, extraocular movements intact, 

sclera anicteric, conjunctiva are normal.


ENT:  TMs normal, nares patent, oropharynx clear without exudates.  Moist 

mucous membranes.


NECK:  Normal range of motion, supple without lymphadenopathy or JVD.


LUNGS:  Breath sounds clear to auscultation bilaterally and equal.  No wheezes 

rales or rhonchi.


HEART:  Regular rate and rhythm without murmurs, rubs or gallops.


ABDOMEN:  Soft, nontender, normoactive bowel sounds.  No guarding, no rebound.  

No masses appreciated. 


BACK:  No CVA tenderness, no spinal tenderness, step-offs or deformities


EXTREMITIES:  Normal range of motion, no pitting or edema.  No clubbing or 

cyanosis.


NEUROLOGICAL:  Cranial nerves II through XII grossly intact.  Normal speech, 

normal gait.  5/5 strength, normal movement in all extremities, normal sensation

, normal reflexes


PSYCH:  Normal mood, normal affect.


SKIN:  Warm, dry, normal turgor, no visible rashes or lesions.











Source: Patient


Exam Limitations: No limitations





- Personal History


Current Tetanus Diphtheria and Acellular Pertussis (TDAP): Yes





- Medical/Surgical History


Hx Asthma: No


Hx Chronic Respiratory Disease: No


Hx Diabetes: Yes


Hx Cardiac Disease: Yes


Hx Renal Disease: No


Hx Cirrhosis: No


Hx Alcoholism: No


Hx HIV/AIDS: No


Hx Splenectomy or Spleen Trauma: No


Other PMH: aortic valve stenosis; BPH with lower urinary tract symptoms; CAD; 

coronary atherosclerosis; DM type II; Heart murmur; Hypercholesterolemia; HTN; 

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

; CABG x1 AVR bioprosthetic; TURP a fib





- Family History


Significant Family History: No pertinent family hx





- Social History


Smoking Status: Never smoked


Alcohol Use: None


Constitutional: 


 Initial Vital Signs











Temperature (C)  36.5 C   02/23/19 11:19


 


Heart Rate  75   02/23/19 11:19


 


Respiratory Rate  18   02/23/19 11:19


 


Blood Pressure  109/66   02/23/19 11:19


 


O2 Sat (%)  94   02/23/19 11:19








 











O2 Delivery Mode               Room Air


 


O2 (L/minute)                  2














Allergies/Adverse Reactions: 


 





clindamycin Allergy (Verified 02/23/19 11:18)


 Rash


codeine Allergy (Verified 02/23/19 11:18)


 Rash








Home Medications: 














 Medication  Instructions  Recorded


 


Multivitamins [Multivitamin (*)] 1 tab PO DAILY 01/02/17


 


metFORMIN HCL [Glucophage 500 mg 500 mg PO BID@08,18 01/02/17





(*)]  


 


Acetaminophen [Tylenol 325mg (*)] 650 mg PO Q6H PRN 02/12/19


 


Irbesartan [Avapro 150 mg (*)] 150 mg PO BID@08,18 02/12/19


 


Albuterol [Proventil Inhaler HFA 1 - 2 puffs IH Q4H PRN 02/13/19





(*)]  


 


Fluticasone/Vilanterol [Breo 1 puffs PO DAILY 02/13/19





Ellipta 100-25 Mcg INH]  


 


Metoprolol Tartrate [Lopressor 100 100 mg PO BID@08,20 02/13/19





mg (*)]  


 


Mirabegron [Myrbetriq] 25 mg PO DAILY 02/13/19


 


Apixaban [Eliquis] 5 mg PO BID@08,18 02/23/19


 


Diltiazem HCl [Diltiazem 24Hr Cd] 240 mg PO DAILY 02/23/19


 


Furosemide [Lasix 20 MG (*)] 20 mg PO BID@08,18 02/23/19


 


Potassium Cl [Klor-Con] 10 meq PO DAILY 02/23/19














Medical Decision Making





- Diagnostics


EKG Interpretation: 





An EKG obtained and was read and documented in trace view.  Please see trace 

view for full reading and report.  Sinus rhythm, LVH with 3 pole, similar to 

previous 


Imaging: Discussed imaging studies w/ On call Radiologist


ED Course/Re-evaluation: 





2:15 p.m. discussed the echo results with Dr. Colon he did he does not see any 

valvular problems.  He does have a trace pericardial effusion but no sign of 

tamponade or ventricular dysfunction.





3:00 p.m. We discussed the case with Dr. Olsen who recommends IR to drain the 

effusion.  Discussed the case with Dr. Mackenzie Glez who will admit the 

medical service.  Patient does qualify as septic with a small pneumonia seen on 

CT scan in light of his elevated white count and lactate.  Will start on 

antibiotics and give fluid bolus slowly.  May also be primarily due to viral 

bronchitis.


Differential Diagnosis: 





Partial list of the Differential diagnosis considered include but were not 

limited to;  PE, pericardial effusion, pericarditis, pneumonia, pleural 

effusion and although unlikely based on the history and physical exam, I also 

considered the dissection, acute coronary disease.  


Critical Care Time: 





Critical care time spent by me, Dr. Dorantes exclusive with this patient was 45 

minutes, exclusive of the PA time exclusive of procedures.  The organ system 

that was at risk was cardiovascular and I gave IV fluids, antibiotics, 

consultation and admission to prevent worsening of the patient's condition





- Data Points


Laboratory Results: 


 Laboratory Results





 02/23/19 11:40 





 02/23/19 11:40 








Medications Given: 


 





Aspirin (Aspirin)  650 mg PO BID@0800,1800 [facility]


   Stop: 08/23/19 18:44


   Last Admin: 02/24/19 18:44 Dose:  650 mg


Azithromycin (Zithromax)  500 mg PO DAILY [facility]


   PRN Reason: Protocol


   Stop: 03/26/19 08:59


   Last Admin: 02/24/19 09:24 Dose:  500 mg


Diltiazem HCl (Cardizem Er Q24hr)  240 mg PO DAILY [facility]


   Stop: 08/23/19 08:59


   Last Admin: 02/24/19 09:24 Dose:  240 mg


Furosemide (Lasix)  20 mg PO BID@08,18 [facility]


   Stop: 08/22/19 17:59


   Last Admin: 02/24/19 18:24 Dose:  20 mg


Hydralazine HCl (Apresoline)  50 mg PO BID@0800,1800 [facility]


   Stop: 08/23/19 18:44


   Last Admin: 02/24/19 21:51 Dose:  50 mg


Irbesartan (Avapro)  150 mg PO BID@08,18 [facility]


   Stop: 08/22/19 17:59


   Last Admin: 02/24/19 18:24 Dose:  150 mg


Metformin HCl (Glucophage)  500 mg PO BID@08,18 [facility]


   Stop: 08/22/19 17:59


   Last Admin: 02/23/19 19:34 Dose:  Not Given


Metoprolol Tartrate (Lopressor)  100 mg PO BID@08,20 [facility]


   Stop: 08/22/19 19:59


   Last Admin: 02/24/19 18:28 Dose:  100 mg


Miscellaneous Medication (Fluticasone/Vilanterol [Breo Ellipta 100-25 Mcg Inh])

  1 puffs PO DAILY [facility]


   Stop: 08/23/19 08:59


   Last Admin: 02/24/19 11:00 Dose:  1 puffs


Miscellaneous Medication (Mirabegron [Myrbetriq])  25 mg PO DAILY [facility]


   Stop: 08/23/19 08:59


   Last Admin: 02/24/19 09:25 Dose:  25 mg


Potassium Chloride (Klor-Con)  10 meq PO DAILY [facility]


   Stop: 08/23/19 08:59


   Last Admin: 02/24/19 09:25 Dose:  10 meq





Discontinued Medications





Albuterol/Ipratropium (Duoneb)  3 ml IH Q6 TOMMY


   Stop: 08/23/19 00:00


   Last Admin: 02/24/19 10:47 Dose:  Not Given


Levofloxacin/Dextrose (Levaquin 750 Mg (Premix))  150 mls @ 100 mls/hr IV EDNOW 

ONE


   PRN Reason: Protocol


   Stop: 02/23/19 16:20


   Last Admin: 02/23/19 15:09 Dose:  150 mls


Sodium Chloride (Ns)  2,400 mls @ 400 mls/hr 30 ml/kg infuse over 6 hr (2400 ml

) IV EDNOW ONE


   PRN Reason: Protocol


   Stop: 02/23/19 20:50


   Last Admin: 02/23/19 15:10 Dose:  2,400 mls





Point of Care Test Results: 


 Chemistry











  02/23/19





  11:51


 


POC Troponin I  0.02 ng/mL ng/mL





   (0.00-0.08) 














Departure





- Departure


Disposition: [facility]


Clinical Impression: 


 Pleural effusion, Bronchitis





Pneumonia


Qualifiers:


 Pneumonia type: due to unspecified organism Laterality: left Lung location: 

lower lobe of lung Qualified Code(s): J18.1 - Lobar pneumonia, unspecified 

organism





Condition: Fair

## 2019-02-23 NOTE — CPEKG
Test Reason : OPEN

Blood Pressure : ***/*** mmHG

Vent. Rate : 067 BPM     Atrial Rate : 067 BPM

   P-R Int : 167 ms          QRS Dur : 086 ms

    QT Int : 453 ms       P-R-T Axes : 061 045 208 degrees

   QTc Int : 479 ms

 

Sinus rhythm

Probable left atrial enlargement

Probable LVH with secondary repol abnrm

 

 

Confirmed by Star Jordan ([id number]) on 2/23/2019 12:30:52 PM

 

Referred By: STAR JORDAN           Confirmed By:Star Jordan

## 2019-02-24 LAB
INR PPP: 1.42 (ref 0.83–1.16)
PLATELET # BLD: 206 10^3/UL (ref 150–400)
PROTHROMBIN TIME: 17.5 SEC (ref 12–15)

## 2019-02-24 PROCEDURE — 0W9B4ZX DRAINAGE OF LEFT PLEURAL CAVITY, PERCUTANEOUS ENDOSCOPIC APPROACH, DIAGNOSTIC: ICD-10-PCS | Performed by: RADIOLOGY

## 2019-02-24 RX ADMIN — IPRATROPIUM BROMIDE AND ALBUTEROL SULFATE SCH ML: .5; 3 SOLUTION RESPIRATORY (INHALATION) at 05:52

## 2019-02-24 RX ADMIN — POTASSIUM CHLORIDE SCH MEQ: 750 TABLET, EXTENDED RELEASE ORAL at 09:25

## 2019-02-24 RX ADMIN — METOPROLOL TARTRATE SCH MG: 100 TABLET, FILM COATED ORAL at 09:24

## 2019-02-24 RX ADMIN — ASPIRIN SCH MG: 325 TABLET, FILM COATED ORAL at 18:44

## 2019-02-24 RX ADMIN — DILTIAZEM HYDROCHLORIDE SCH MG: 120 CAPSULE, EXTENDED RELEASE ORAL at 09:24

## 2019-02-24 RX ADMIN — IPRATROPIUM BROMIDE AND ALBUTEROL SULFATE SCH: .5; 3 SOLUTION RESPIRATORY (INHALATION) at 10:47

## 2019-02-24 RX ADMIN — METOPROLOL TARTRATE SCH MG: 100 TABLET, FILM COATED ORAL at 18:28

## 2019-02-24 RX ADMIN — FUROSEMIDE SCH MG: 20 TABLET ORAL at 09:23

## 2019-02-24 RX ADMIN — IRBESARTAN SCH MG: 150 TABLET ORAL at 18:24

## 2019-02-24 RX ADMIN — IRBESARTAN SCH MG: 150 TABLET ORAL at 09:24

## 2019-02-24 RX ADMIN — AZITHROMYCIN SCH MG: 250 TABLET, FILM COATED ORAL at 09:24

## 2019-02-24 RX ADMIN — FUROSEMIDE SCH MG: 20 TABLET ORAL at 18:24

## 2019-02-24 NOTE — ASMTCMCOM
CM Note

 

CM Note                       

Notes:

Pt is a 74 yo M presents with pleural effision, pneumonia, and sepsis. Pt was last discharged from 

here 2/16/19. Pt had CABG in November 2018. Last two discharged pt was independent. No therapies 

ordered at this time, Discharge plan TBD. CM to follow. 



Plan: TBD

 

Date Signed:  02/24/2019 01:43 PM

Electronically Signed By:EMMANUEL Kapoor

## 2019-02-24 NOTE — HOSPPROG
Hospitalist Progress Note


Assessment/Plan: 





73-year-old male, discharged from the hospital on February 16th where he was 

admitted for hypoxia and drainage of right pleural effusion with VATS and talc 

pleurodesis.  Her it if he now presents to the hospital with dyspnea on 

exertion for 3 days found to have large left pleural effusion.





Large left pleural effusion- noted on chest x-ray, patient with no symptoms of 

infection.  Etiology yet to be determined.  Previous thoracentesis fluid not 

sent for correct cell count to evaluate transit date of versus exudative 

effusion.


-IR guided thoracentesis today


-send fluid for cell count analysis to determine etiology








Viral bronchitis- respiratory panel positive for corona virus.  Started on 

azithromycin


-continue azithromycin for anti-inflammatory properties


-oxygen p.r.n.


-supportive care





AFib- hold Eliquis for thoracentesis





Coronary artery disease- status post 1 vessel coronary artery bypass grafting 

and bioprosthetic aortic valve replacement in November of 2018.


-Dr. Posey has stopped by to see the patient


-doubt pleural effusion is related to this


-continue home medications for coronary artery disease





Asthma- continue nebulizers





Prophylaxis- SCDs, no heparin until after procedure


Fluids- none


Electrolytes- within normal limits


Nutrition- NPO until after procedure


Cor- full


Dispo- Inpatient, for dyspnea on exertion, corona virus, left-sided pleural 

effusion.  


Subjective: Winded with any exertion


Objective: 


 Vital Signs











Temp Pulse Resp BP Pulse Ox


 


 36.7 C   75   10 L  167/89 H  99 


 


 02/24/19 11:26  02/24/19 11:26  02/24/19 11:26  02/24/19 11:26  02/24/19 11:26








 Laboratory Results





 02/24/19 04:22 





 











 02/23/19 02/24/19 02/25/19





 05:59 05:59 05:59


 


Intake Total  750 


 


Output Total  870 


 


Balance  -120 








 











PT  17.5 SEC (12.0-15.0)  H  02/24/19  04:22    


 


INR  1.42  (0.83-1.16)  H  02/24/19  04:22    














- Physical Exam


Constitutional: no apparent distress, appears nourished, not in pain


Eyes: PERRL, anicteric sclera, EOMI


Ears, Nose, Mouth, Throat: moist mucous membranes, hearing normal, ears appear 

normal, no oral mucosal ulcers


Cardiovascular: regular rate and rhythym, no murmur, rub, or gallop


Respiratory: reduced air movement (Reduced air movement bilaterally.)


Gastrointestinal: normoactive bowel sounds, soft, non-tender abdomen, no 

palpable masses


Genitourinary: no bladder fullness, no bladder tenderness, no renal bruits


Skin: no rashes or abrasions, no fluctuance, no induration


Musculoskeletal: full muscle strength, no muscle tenderness, normal joint ROM


Neurologic: AAOx3, sensation intact bilaterally


Psychiatric: interacting appropriately, not anxious, not encephalopathic, 

thought process linear


Lymph, Heme, Immunologic: no cervical LAD, no supraclavicular LAD





ICD10 Worksheet


Patient Problems: 


 Problems











Problem Status Onset


 


Pleural effusion Acute  


 


Pneumonia Acute  


 


Acute blood loss as cause of postoperative anemia Acute  


 


Aortic stenosis Acute  


 


BPH w urinary obs/LUTS Acute  


 


Coronary stent restenosis Acute  


 


Recurrent pleural effusion on right Acute  


 


S/P coronary artery bypass graft x 1 Acute  


 


Paroxysmal atrial fibrillation Chronic

## 2019-02-24 NOTE — SOAPPROG
SOAP Progress Note


Assessment/Plan: 


Assessment:


? viral pneumonia


resolving postpericardiotomy syndrome























Plan:





02/24/19 10:13


small L effusion secondary to postpericardiotomy syndrome, resolving. Attempt 

thoracentesis, but small may not be achievable


will DC Eliquis and prescribe 650 ASA bid for 2 weeks


no documented preop AF, will obtain 30 day event monitor post DC


encouraged to walk


all questions addressed


Objective: 





 Vital Signs











Temp Pulse Resp BP Pulse Ox


 


 36.6 C   81   20   178/90 H  94 


 


 02/24/19 08:02  02/24/19 08:02  02/24/19 08:02  02/24/19 08:02  02/24/19 08:02








 Laboratory Results





 02/24/19 04:22 





 











 02/23/19 02/24/19 02/25/19





 05:59 05:59 05:59


 


Intake Total  750 


 


Output Total  870 


 


Balance  -120 








 











PT  17.5 SEC (12.0-15.0)  H  02/24/19  04:22    


 


INR  1.42  (0.83-1.16)  H  02/24/19  04:22    














ICD10 Worksheet


Patient Problems: 


 Problems











Problem Status Onset


 


Pleural effusion Acute  


 


Pneumonia Acute  


 


Acute blood loss as cause of postoperative anemia Acute  


 


Aortic stenosis Acute  


 


BPH w urinary obs/LUTS Acute  


 


Coronary stent restenosis Acute  


 


Recurrent pleural effusion on right Acute  


 


S/P coronary artery bypass graft x 1 Acute  


 


Paroxysmal atrial fibrillation Chronic

## 2019-02-25 VITALS — SYSTOLIC BLOOD PRESSURE: 130 MMHG | DIASTOLIC BLOOD PRESSURE: 66 MMHG

## 2019-02-25 RX ADMIN — ASPIRIN SCH MG: 325 TABLET, FILM COATED ORAL at 08:32

## 2019-02-25 RX ADMIN — AZITHROMYCIN SCH MG: 250 TABLET, FILM COATED ORAL at 08:30

## 2019-02-25 RX ADMIN — DILTIAZEM HYDROCHLORIDE SCH MG: 120 CAPSULE, EXTENDED RELEASE ORAL at 08:30

## 2019-02-25 RX ADMIN — POTASSIUM CHLORIDE SCH MEQ: 750 TABLET, EXTENDED RELEASE ORAL at 08:29

## 2019-02-25 RX ADMIN — FUROSEMIDE SCH MG: 20 TABLET ORAL at 08:29

## 2019-02-25 RX ADMIN — IRBESARTAN SCH MG: 150 TABLET ORAL at 08:30

## 2019-02-25 RX ADMIN — METOPROLOL TARTRATE SCH MG: 100 TABLET, FILM COATED ORAL at 08:30

## 2019-02-25 NOTE — ASMTDCNOTE
Case Management Discharge

 

Discharge Order Complete?     Answers:  Yes                                   

Patient to Obtain             Answers:  via Family                            

Medications                                                                   

Transportation Arranged       Answers:  Family/Friends                        

Family Notified               Answers:  Yes                                   

Discharge Comments            

Notes:

Patient discharged to home with family support, No needs.

 

Date Signed:  02/25/2019 04:11 PM

Electronically Signed By:Elo Mittal RN

## 2019-02-25 NOTE — ASDISCHSUM
----------------------------------------------

Discharge Information

----------------------------------------------

Plan Status:Home with No Needs                       Medically Cleared to Leave:02/24/2019

Discharge Date:02/25/2019 04:08 PM                   CM D/C Disposition:Home, Routine, Self-Care

ADT D/C Disposition:Home, Routine, Self-Care         Projected Discharge Date:02/25/2019 04:08 PM

Transportation at D/C:                               Discharge Delay Reason:

Follow-Up Date:02/25/2019 04:08 PM                   Discharge Slot:

Final Diagnosis:

----------------------------------------------

Placement Information

----------------------------------------------

----------------------------------------------

Patient Contact Information

----------------------------------------------

Contact Name:GRISEL                         Relationship:Wife

Address:[address] [address] [phone]

                                                     Work [phone]

City:[place]                                        Alternate Phone:

State/Zip Code:[place] [address]                              Email:

----------------------------------------------

Financial Information

----------------------------------------------

Financial Class:Medicare Advantage Plans

Primary Plan Desc:[employer]         Primary Plan Number:[id number]

Secondary Plan Desc:                                 Secondary Plan Number:

 

 

----------------------------------------------

Assessment Information

----------------------------------------------

----------------------------------------------

[facility] CM Progress Note

----------------------------------------------

CM Note

 

CM Note                       

Notes:

Pt is a 72 yo M presents with pleural effision, pneumonia, and sepsis. Pt was last discharged from 

here 2/16/19. Pt had CABG in November 2018. Last two discharged pt was independent. No therapies 

ordered at this time, Discharge plan TBD. CM to follow. 



Plan: TBD

 

Date Signed:  02/24/2019 01:43 PM

Electronically Signed By:EMMANUEL Kapoor

 

 

----------------------------------------------

LACE

----------------------------------------------

LACE

 

Length of stay for            Answers:  2 days                                

current admission                                                             

Comorbidities - select        Answers:  Any tumor (including                  

all that apply                          lymphoma or leukemia)                 

                                        Coronary Artery Disease               

                                        Diabetes (uncontrolled or             

                                        controlled)                           

                                        Other                         Notes:  AFib; Hx of CABG; HTN

# of Emergency department     Answers:  1-2                                   

visits in the last 6                                                          

months                                                                        

Score: 9

 

Date Signed:  02/25/2019 04:09 PM

Electronically Signed By:Elo Mittal RN

 

 

----------------------------------------------

Case Management Discharge Plan Note

----------------------------------------------

Case Management Discharge

 

Discharge Order Complete?     Answers:  Yes                                   

Patient to Obtain             Answers:  via Family                            

Medications                                                                   

Transportation Arranged       Answers:  Family/Friends                        

Family Notified               Answers:  Yes                                   

Discharge Comments            

Notes:

Patient discharged to home with family support, No needs.

 

Date Signed:  02/25/2019 04:11 PM

Electronically Signed By:Elo Mittal RN

 

 

----------------------------------------------

Intervention Information

----------------------------------------------

## 2019-03-19 ENCOUNTER — HOSPITAL ENCOUNTER (OUTPATIENT)
Dept: HOSPITAL 80 - FIMAGING | Age: 74
End: 2019-03-19
Attending: GENERAL ACUTE CARE HOSPITAL
Payer: COMMERCIAL

## 2019-03-19 ENCOUNTER — HOSPITAL ENCOUNTER (INPATIENT)
Dept: HOSPITAL 80 - FCATH | Age: 74
LOS: 3 days | Discharge: HOME | DRG: 246 | End: 2019-03-22
Attending: THORACIC SURGERY (CARDIOTHORACIC VASCULAR SURGERY) | Admitting: THORACIC SURGERY (CARDIOTHORACIC VASCULAR SURGERY)
Payer: COMMERCIAL

## 2019-03-19 DIAGNOSIS — J90: ICD-10-CM

## 2019-03-19 DIAGNOSIS — J18.9: Primary | ICD-10-CM

## 2019-03-19 DIAGNOSIS — I50.33: ICD-10-CM

## 2019-03-19 DIAGNOSIS — I48.0: ICD-10-CM

## 2019-03-19 DIAGNOSIS — Z95.1: ICD-10-CM

## 2019-03-19 DIAGNOSIS — Z79.01: ICD-10-CM

## 2019-03-19 DIAGNOSIS — N40.1: ICD-10-CM

## 2019-03-19 DIAGNOSIS — N32.81: ICD-10-CM

## 2019-03-19 DIAGNOSIS — I31.1: Primary | ICD-10-CM

## 2019-03-19 DIAGNOSIS — I11.0: ICD-10-CM

## 2019-03-19 DIAGNOSIS — Z85.46: ICD-10-CM

## 2019-03-19 DIAGNOSIS — E11.9: ICD-10-CM

## 2019-03-19 DIAGNOSIS — E78.5: ICD-10-CM

## 2019-03-19 DIAGNOSIS — T82.858A: ICD-10-CM

## 2019-03-19 DIAGNOSIS — Z95.3: ICD-10-CM

## 2019-03-19 LAB
INR PPP: 1.5 (ref 0.83–1.16)
PLATELET # BLD: 199 10^3/UL (ref 150–400)
PROTHROMBIN TIME: 17.4 SEC (ref 12–15)

## 2019-03-19 PROCEDURE — C1887 CATHETER, GUIDING: HCPCS

## 2019-03-19 PROCEDURE — C1769 GUIDE WIRE: HCPCS

## 2019-03-19 PROCEDURE — C1760 CLOSURE DEV, VASC: HCPCS

## 2019-03-19 PROCEDURE — C1725 CATH, TRANSLUMIN NON-LASER: HCPCS

## 2019-03-19 PROCEDURE — C9604 PERC D-E COR REVASC T CABG S: HCPCS

## 2019-03-19 PROCEDURE — C1874 STENT, COATED/COV W/DEL SYS: HCPCS

## 2019-03-19 RX ADMIN — METOPROLOL TARTRATE SCH MG: 100 TABLET, FILM COATED ORAL at 21:31

## 2019-03-19 RX ADMIN — IRBESARTAN SCH MG: 150 TABLET ORAL at 21:25

## 2019-03-19 NOTE — ECHO
[email or web address]





[address] [address] 

Main: [phone] 



Echocardiography Examination 

Transthoracic 



Name:            SUN CASTRO                          MR#:

[id number]

Study Date:      03/19/2019                             Study Time:

12:04 PM

YOB: 1945                             Age:

73 year(s)

Height:          172.7 cm (68 in.)                      Weight:

83.92 kg (185 lb.)

BSA:             1.98 m2                                Gender:

Male

Examination:     Echo                                   Contrast: 

Image Quality:   Fair                                   Rhythm: 

Heart Rate:                                             BP:

105 mmHg/60 mmHg

Indication:      Shortness of breath, Aortic Valve Regurgitation 



Procedure Staff 

Referring Physician: 

Echocardiographer:         Blayne Flores RDCS 

Reading Physician:         Deirdre Kemp MD 

Requesting Provider:       Ubaldo Posey DO 

Ordering Physician:         Jesús Abel  

Indication:                Shortness of breath, Aortic Valve

Regurgitation



Measurements 

Chambers                                           AV/MV 

Label                 Value       Normal Value          Label

Value       Normal Value

EF lower range (%)      60 %                            AV PGmax

21 mmHg

EF upper range (%)      65 %                            AV PGmean

11 mmHg

IVSd, 2D                0.8 cm    (0.6cm - 1.1cm)       AV Vmax

2.28 m/s

LVDd, 2D                3.9 cm    (4.2cm - 5.9cm)       RANDOLPH

(continuity eq.  1.8 cm2

LVDs, 2D                2.6 cm    (2.1cm - 4cm)         Vmax) 

LVEF, 2D                63 %      (54% - 74%)           RANDOLPH D

(continuity eq. 1.6 cm2

LVOT PGmax              4 mmHg                          VTI) 

LVOT PGmean             2 mmHg                          MV A Vmax

0.5 m/s

LVOT Vmax               1.06 m/s  (0.7m/s - 1.1m/s)     MV E' lateral

0.05 m/s

LVOT Vmean              0.65 m/s                        MV E' mean

0.04 m/s

LVOTd                   2.2 cm    (1.9cm - 2.1cm)       MV E' septal

0.04 m/s

LVPWd, 2D               1 cm      (0.6cm - 1cm)         MV E Vmax

1.08 m/s

LA Area, A2C            22.5 cm2  (0cm2 - 20cm2)        MV E/A

2.16

LA Volume, A2C          75 ml     (18ml - 58ml)         MV E/E'

lateral      22.2

LA Volume, A4C          56 ml     (16ml - 34ml)         MV E/E' mean

24

LA Volume, BP           65 ml     (18ml - 58ml)         MV E/E' septal

26 (0.45 - 1.25)

LAD Index, 2D           2.22 cm/m2                 TV/PV 

LADs, 2D                4.4 cm    (3cm - 4cm)           Label

Value       Normal Value

LAESV index, MOD4       28.3 ml/m2                      PV PGmax

2 mmHg



Patient: SUN CASTRO                       MRN: [id number]

Study Date: 03/19/2019   Page 1 of 3

12:04 PM 









Additional Vessels PV PGmean 1 mmHg 

Label                 Value       Normal Value          PV Vmax,

Caliper     0.73 m/s     (0.6m/s - 0.9m/s)

AoRoot, MM              2.8 cm    (2.2cm - 3.7cm)       PV Vmean

0.56 m/s



PV VTI               18.9 cm 



Conclusions 

1. LV is normal in size and systolic function.  Normal wall motion.

There is grade III diastolic function with elevated left

heart filling pressures.   

2.RV is normal in size and systolic function. 

3. Mild left atrial dilation. 

4. Normally functioning aortic valve bioprosthesis.   

5. Mild mitral regurgitation.   

6. There are echocardiographic/Doppler findings suggestive of a

mixture of restrictive cardiomyopathy and constrictive

pericarditis.  Elevated right atrial pressures as evidenced by dilated

IVC, variation in mitral and tricuspid valve inflow

with inspiration as well as variations in hepatic vein inflow pattern,

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

with constrictive pericarditis. 



Findings 



Left Ventricle: 

Left ventricle is normal in size. Normal global systolic left

ventricular function. The ejection fraction, measured by 2D, is

63 %. EF range is estimated at 60 % - 65 %. There is mild concentric

left ventricular hypertrophy. There are no regional

wall motion abnormalities. Grade III Diastolic Dysfunction. Left

atrial filling pressure is elevated.

Right Ventricle: 

Normal size right ventricle. Right ventricular systolic function is

normal.

Left Atrium: 

The left atrium is mildly dilated.  

Right Atrium: 

The right atrium is normal in size.  

Mitral Valve: 

Mild mitral regurgitation. No mitral valve stenosis. There is mild

mitral calcification.

Aortic Valve: 

The aortic valve is a bioprosthesis. Normal functioning aortic valve

prosthesis. No prosthesis regurgitation. No

prosthesis stenosis.  

Tricuspid Valve: 

No significant tricuspid regurgitation. Pulmonary artery pressure

cannot be assessed due to inadequate TR signal.

Pulmonic Valve: 

No pulmonic valve regurgitation is evident.  

Aorta: 

The aorta is normal. The aortic root size in M-mode measures 2.8 cm.  

Aorta Measurements 

AoRoot, MM is 2.8 cm.  

Pericardium: 

Pericardium is mildly thickened and echo bright.  IVC is dilated.

There is variation in mitral and tricuspid inflow as well

as hepatic vein inflow variation suggestive of constrictive

pericarditis.. No pericardial effusion. There is a pleural

effusion present.  



Exam Details 

Procedure Ordered:         Echo 

Procedure Status:          Routine study 

Image Quality:             Fair 

Facility Location:         Cardiac Echo 1 



Patient: SUN CASTRO                       MRN: [id number]

Study Date: 03/19/2019   Page 2 of 3

12:04 PM 









Electronically signed by Deirdre Kemp MD on 03/19/2019 at 05:32 PM 

(No Signature Object) 



Patient: SUN CASTRO                       MRN: [id number]

Study Date: 03/19/2019   Page 3 of 3

12:04 PM 







[id number]

## 2019-03-19 NOTE — GCON
[f rep st]



                                                                    CONSULTATION





CARDIOLOGY CONSULT



DATE OF CONSULTATION:  03/19/2019





CHIEF COMPLAINT:  Shortness of breath and fluid retention.



HISTORY OF PRESENT ILLNESS:  This is a 73-year-old male with a history of single-vessel bypass surger
y and bioprosthetic AVR placement in the last 6 months, who has had a somewhat margret course post [facility].  The patient has indicated that he has had multiple episodes of pleural effusions that have ha
d to be tapped multiple times since his surgery.  He returns today to see Dr. Posey in the office and
 indicates having again retained fluid in his lungs as well as shortness of breath.  He denies any ch
est pain.  The patient was sent from Dr. Posey's office to [facility] for elective echocardiogram, right and
 left heart catheterization, and CT scanning of the chest.  Blood pressure and heart rate are current
ly stable at this point.



PAST MEDICAL HISTORY:  Significant for hypertension, hyperlipidemia, coronary artery disease, status 
post single-vessel bypass and bioprosthetic AVR done within the last 6 months.



SOCIAL HISTORY:  Patient is .  Occasional alcohol use.  No drug use.



HOME MEDICATIONS:  Please see patient's attached list.



REVIEW OF SYSTEMS:  The patient currently denies any visual changes.  No headache.  No throat pain.  
No neck pain.  No ear pain.  No eye pain.  No chest pain.  He does indicate having moderate dyspnea w
ith minimal exertion.  No abdominal discomfort.  He does indicate having lower extremity edema.  No n
eurologic issues.



PHYSICAL EXAM:  VITAL SIGNS:  The patient is currently afebrile, 96.  Blood pressure 136/70 with a he
art rate of 72, respiratory rate is 12, sat 95% on room air.  HEENT:  Pupils equal, round, reactive t
o light and accommodation.  Extraocular movements intact.  CARDIOVASCULAR:  Regular rate and rhythm, 
S1, S2 with a 2/6 systolic ejection murmur.  LUNGS:  Decreased breath sounds at the left lower base. 
 ABDOMEN:  Soft, nontender, no guarding.  EXTREMITIES:  No clubbing, no cyanosis, no edema.  NEUROLOG
IC:  The patient is alert and oriented x3.



LABORATORY VALUES:  Currently pending at this point.



ASSESSMENT/PLAN:  Shortness of breath/pleural effusions.  At this time, it appears the patient is onc
e again retaining fluid.  We will obtain a surface echocardiogram to evaluate his underlying bioprost
hetic AVR ejection fraction as well as evaluate for constriction versus restriction.  We will also pe
rform a right and left heart catheterization to evaluate for coronary artery disease as well as const
rictive physiology.  If these tests are essentially normal or have not given a definitive answer to h
is symptoms, a CT of the chest will be ordered next to evaluate further.





Job #:  [id number]

## 2019-03-19 NOTE — CPIP
[f rep st]



                                                       INVASIVE CARDIAC PROCEDURE





DATE OF PROCEDURE:  03/19/2019



INDICATION FOR PROCEDURE:  Shortness of breath, recurrent heart failure, prior open heart surgery.



PROCEDURE:  

1.  Nonselective right groin sheathogram.

2.  Seven-Barbadian sheath right femoral vein.

3.  Right heart catheterization with Rothville-Shona catheter.

4.  Left ventricular end-diastolic pressure and right heart measurements with dual pressure measureme
nts.

5.  Left ventriculogram.

6.  Left heart catheterization.

7.  Bilateral coronary selective coronary angiography.

8.  Saphenous vein angiography to marginal 1 artery.

9.  Percutaneous-guided coronary intervention of saphenous vein graft to marginal 1 artery.



HISTORY:  Briefly, a 73-year-old male with history of open heart surgery in the last 6 months.  Patie
nt had recurrent episodes of heart failure necessitating multiple thoracentesis secondary to pleural 
effusions.  The patient was again admitted for this situation and was consented for right and left he
art catheterization in anticipation for potential pericardiectomy given his findings on echocardiogra
m, suspicion of constriction.



DESCRIPTION OF PROCEDURE:  After informed consent was obtained, the patient was brought to [facility], where the right groin was prepped and draped in normal sterile fashion.  Using lidoca
ine, a short 6-Barbadian sheath in the right common femoral vein __________ 7-Barbadian sheath right common
 femoral vein, 6-Barbadian sheath verified angiographically.  A Gonzales pigtail was placed in the left 
ventricle.  Simultaneous pressures were obtained with a Gonzales pigtail which from the LV and the AO
 which revealed minimal pressure difference in the LV and aorta of approximately 4-5 mm.  The pigtail
 was left in the left ventricle.  A Rothville-Shona catheter was advanced.  RA pressure was then measured. 
 Of note, there was significant rapid descents of both the X and Y waveforms in the RA pressure with 
the elevated RA pressure of approximately 20.  The mean was approximately 18 with A-wave 26, a V-wave
 of 19, RV systolic pressure was 52 with a diastolic of approximately 15 with the end of 19.  _______
___ pressures were then obtained from the RV and LV, which showed again, dip in plateau changes in th
e diastolic pressure.  The Rothville-Shona was then advanced and a wedge pressure was measured, a mean of 2
0 with the A-wave of 21 and a V-wave of 23.  Of note, the LVEDP was measured to be approximately 13. 
 The PA pressure was measured to be 51/20 with a mean of 31.  Cardiac output was measured to be 4.2 b
y Irma with a cardiac index of 2.1.  AO sat was 92%.  PA sat was 61%.  Of note, when the Rothville-Shona ca
theter was then pulled back into the RV, measurements were re-obtained again with the patient taking 
deep inspirations showing respiratory variations between the LV and the RV systolic pressures.  After
 this was obtained, the Rothville-Shona catheter was removed.  A JL4 catheter was then advanced to the left
 coronary artery.  Images of the left coronary artery revealed __________ circumflex artery gave off 
a marginal 1 artery which appeared to have a competitive flow from a bypass graft.  The marginal 2 ar
alanna was stented, but apparently had mild-to-moderate in-stent restenosis with no significant stenosi
s.  The LAD was the vessel that was stented.  The LAD stent only showed mild in-stent restenosis with
 mild 30% disease proximal to the stent.  There was small diagonal arteries coming off the LAD which 
had some diffuse 30% to 40% disease but, again, these were small vessels.  After these images were ob
tained, the JL4 catheter was removed.  A JR4 catheter was advanced to the right coronary artery.  Gisela
ges of the right coronary artery revealed normal ostial RCA.  In the mid RCA, there was 30% to 40% tu
bular disease.  Distally, the RPD and R+ had plaque 10% to 20% disease but no high-grade stenosis.  A
fter these images were obtained, the JR4 catheter was pulled back and placed into the saphenous vein 
graft to the marginal 1 artery.  The ostium __________ mid area of the vein graft looked widely paten
t.  However, at the anastomotic site, there appeared to be excessive tenting with haziness with what 
appeared to be at least an 80% anastomotic lesion which was verified on __________ angiogram.  After 
the images were obtained, the JR4 catheter was removed __________ time.  After discussing with Dr. Sam garcia and going through all pressure hemodynamics as well as the preliminary echo report, it was decide
d that the patient appears to have a dual process undergoing currently which consists of both coronar
y disease with the saphenous vein graft, distal anastomotic lesion going to the marginal 1 artery as 
well as hemodynamics consistent with a constrictive process.  Of note, a left ventriculogram was obta
ined prior to any intervention and this left ventriculogram did show a normal ejection fraction of 65
% to 70%.  However, there was, of note, an interesting area of infolding in the inferior left ventric
le, which appeared to be potentially a scar/old thrombus in the pericardium resulting in this view.  
This was also confirmed by Dr. Posey as most likely the etiology.  At this time, we proceeded with th
e intervention.  A JR4 6-Barbadian guide was advanced after the patient was administered a total of 10,0
00 heparin.  The saphenous vein graft was engaged.  A Choice PT wire was placed down into the distal 
marginal 1 artery.  Predilatation commenced with a 2.0 x 12 balloon at 10 atmospheres.  After this wa
s performed, angiogram was obtained which showed improved patency of this area.  We decided to procee
d with stenting this vessel with a 2.25 x 12 mm Synergy drug-eluting stent.  This was deployed at 16 
atmospheres.  After deployment, angiogram was obtained which showed excellent patency of the stented 
region from the saphenous vein graft to the marginal 1 artery with no evidence of dissection or perfo
ration.  At this time, the wire was pulled back.  The guide catheter was removed.  The right groin wa
s closed with 6-Barbadian Angio-Seal.  The 7-Barbadian sheath will be closed with manual pressure.  The pat
ient tolerated the procedure well with no complications.



IMPRESSION:  

1.  Patent stents in the left coronary and right coronary system with mid right coronary artery disea
se of 40%.  

2.  Successful percutaneous coronary intervention of the saphenous vein graft to marginal 1 artery ut
ilizing Synergy 2.25 x 12 mm drug-eluting stent for a distal saphenous vein graft anastomotic lesion.
  

3.  Normal ejection fraction with pericardial changes consistent with potential scar versus thrombus.
  

4.  Moderate pulmonary hypertension.

5.  Hemodynamics consistent with a constrictive process given a square root sign, elevated right atri
al pressure, right ventricular end-diastolic pressure, and left ventricular end-diastolic pressure di
p and plateau with mirror concordance during inspiration.



PLAN:  I discussed with Dr. Posey and we will proceed with pericardiectomy within the next 1-2 days. 
 We will continue Plavix.  Will hold the patient's Eliquis.





Job #:  [id number]

## 2019-03-19 NOTE — CPEKG
Test Reason : OPEN

Blood Pressure : ***/*** mmHG

Vent. Rate : 049 BPM     Atrial Rate : 048 BPM

   P-R Int : 172 ms          QRS Dur : 086 ms

    QT Int : 497 ms       P-R-T Axes : 019 051 148 degrees

   QTc Int : 449 ms

 

Sinus bradycardia

Consider left ventricular hypertrophy

Nonspecific T abnormalities, lateral leads

Significant rate reduction noted in comparison to prior ECG

 

Confirmed by Zak June ([id number]) on 3/19/2019 1:05:26 PM

 

Referred By: Luis Felipe Andrade           Confirmed By:Zak June

## 2019-03-20 LAB
INR PPP: 1.36 (ref 0.83–1.16)
PLATELET # BLD: 180 10^3/UL (ref 150–400)
PROTHROMBIN TIME: 16.2 SEC (ref 12–15)

## 2019-03-20 RX ADMIN — DILTIAZEM HYDROCHLORIDE SCH MG: 240 CAPSULE, EXTENDED RELEASE ORAL at 08:03

## 2019-03-20 RX ADMIN — ASPIRIN SCH MG: 325 TABLET, FILM COATED ORAL at 20:16

## 2019-03-20 RX ADMIN — THERA TABS SCH EACH: TAB at 08:01

## 2019-03-20 RX ADMIN — COLCHICINE SCH MG: 0.6 CAPSULE ORAL at 20:16

## 2019-03-20 RX ADMIN — CLOPIDOGREL BISULFATE SCH MG: 75 TABLET, FILM COATED ORAL at 08:03

## 2019-03-20 RX ADMIN — PANTOPRAZOLE SODIUM SCH MG: 40 TABLET, DELAYED RELEASE ORAL at 20:16

## 2019-03-20 RX ADMIN — POTASSIUM CHLORIDE SCH MEQ: 1500 TABLET, EXTENDED RELEASE ORAL at 20:16

## 2019-03-20 RX ADMIN — FUROSEMIDE SCH MG: 10 INJECTION, SOLUTION INTRAMUSCULAR; INTRAVENOUS at 08:00

## 2019-03-20 RX ADMIN — IRBESARTAN SCH MG: 150 TABLET ORAL at 20:16

## 2019-03-20 RX ADMIN — FUROSEMIDE SCH MG: 10 INJECTION, SOLUTION INTRAMUSCULAR; INTRAVENOUS at 14:24

## 2019-03-20 RX ADMIN — IRBESARTAN SCH MG: 150 TABLET ORAL at 08:01

## 2019-03-20 RX ADMIN — METOPROLOL TARTRATE SCH MG: 100 TABLET, FILM COATED ORAL at 20:16

## 2019-03-20 RX ADMIN — METOPROLOL TARTRATE SCH MG: 100 TABLET, FILM COATED ORAL at 08:02

## 2019-03-20 RX ADMIN — SODIUM CHLORIDE SCH MG: 900 INJECTION, SOLUTION INTRAVENOUS at 20:17

## 2019-03-20 NOTE — PDMN
Medical Necessity


Medical necessity: Pt meets inpt criteria per MD order and MCG M-540, Pleural 

Effusion, A-2 days. 74 y/o w/hx single vessel bypass and AVR replacement in 

last 6 mos complicated by recurrent episodes of heart failure due to pleural 

effusions needing to be tapped mult times since surg. Pt now presents w/

increasing SOB/fluid retention, chest CT shows moderate L pleural effusion 

despite L sided thoracentesis 3 weeks prior and small , partially loculated, R 

pleural effusion, worsening diffuse interstitial edema. Underwent R and LHC 

yesterday, PCI to saphenous vein graft, found to have mod pulm HTN. CT surgery 

consult, possible pericardiectomy in next 1-2 days, IV Lasix, 2 L O2. Anticipate

>2MN for further eval/management of above.

## 2019-03-20 NOTE — PDCARPN
Cardiology Progress Note


Chief Complaint: 





SOB


Assessment/Plan: 


Assessment:





SOB


hx of CABG/AVR


AF


pleural effusions




















Plan:





03/20/19 07:23


had CT, cath, TTE performed


mixed restrictive/constrictive picture by imaging and hemodynamics


d/w Dr. Posey--medical options to be tried first


Lasix IV today


continue plavix, re-start eliquis


check labs in AM.








Subjective: 





stable


Reviewed/Discussed With: multidisciplinary team


Time Spent with Patient: greater than 25 minutes


Time Spent with Patient: Greater than 25 minutes spent on this patients care, 

greater than 50% of time spent counseling, educating, and coordinating care 

regarding the above mentioned plan.


Objective: 





 Vital Signs (8 Hrs)











  Temp Pulse Resp BP Pulse Ox


 


 03/20/19 04:00  36.4 C  62  20  162/80 H  94


 


 03/19/19 23:40  36.4 C  59 L  20  141/66 H  95








 Intake/Output (24 Hrs)











 03/19/19 03/20/19 03/21/19





 05:59 05:59 05:59


 


Intake Total  100 


 


Balance  100 


 


Intake:   


 


  Oral (ml)  100 


 


Other:   


 


  Weight  84.504 kg 


 


  Number of Voids   


 


    Toilet  1 











Result Diagrams: 


 03/20/19 03:32





 03/20/19 03:32





- Physical Exam


Constitutional: no apparent distress


Eyes: PERRL


Ears, Nose, Mouth, Throat: moist mucous membranes


Cardiovascular: regular rate and rhythm


Peripheral Pulses: 1+: femoral (R), femoral (L)


Respiratory: reduced air movement


Gastrointestinal: normoactive bowel sounds


Genitourinary: no suprapubic tenderness


Skin: no rashes


Musculoskeletal: no muscular tenderness


Neurologic: AAOx3





ICD10 Worksheet


Patient Problems: 


 Problems











Problem Status Onset


 


Acute blood loss as cause of postoperative anemia Acute  


 


Aortic stenosis Acute  


 


BPH w urinary obs/LUTS Acute  


 


Bronchitis Acute  


 


Coronary stent restenosis Acute  


 


Pleural effusion Acute  


 


Pneumonia Acute  


 


Recurrent pleural effusion on right Acute  


 


S/P coronary artery bypass graft x 1 Acute  


 


Paroxysmal atrial fibrillation Chronic

## 2019-03-20 NOTE — PDGENHP
History and Physical





- Chief Complaint


SOB, edema





- History of Present Illness


73M with h/o of CABGx1 (SVG-OM2), AVR with #23 Magna bioprosthesis, EVH L thigh 

on 11/8/18 with Dr. JOEL Calvert seen 3/20 in clinic by Dr. Posey with c/o SOB and 

peripheral edema. Pt with complicated post-surgical course secondary to post 

pericardiotomy syndrome requiring multiple thoracenteses as well as a right 

VATS talc pleurodesis on 2/13/19. Pt admitted for right and left heart 

catheterization, TTE, and CT chest to r/o constrictive pericarditis with 

possible need for pericardectomy vs further medical mgmt.      





History Information





- Allergies/Home Medication List


Allergies/Adverse Reactions: 








clindamycin Allergy (Verified 02/23/19 11:18)


 Rash


codeine Allergy (Verified 02/23/19 11:18)


 Rash





Home Medications: 








Multivitamins [Multivitamin (*)] 1 tab PO DAILY 01/02/17 [Last Taken 03/18/19]


metFORMIN HCL [Glucophage 500 mg (*)] 500 mg PO BID@08,20 01/02/17 [Last Taken 

03/17/19]


Irbesartan [Avapro 150 mg (*)] 150 mg PO BID@08,20 02/12/19 [Last Taken 03/19/19

]


Albuterol [Proventil Inhaler HFA (*)] 1 - 2 puffs IH Q4H PRN 02/13/19 [Last 

Taken 02/23/19 08:00]


Fluticasone/Vilanterol [Breo Ellipta 100-25 Mcg INH] 1 puffs PO DAILY 02/13/19 [

Last Taken 03/18/19]


Metoprolol Tartrate [Lopressor 100 mg (*)] 100 mg PO BID@08,20 02/13/19 [Last 

Taken 03/19/19]


Mirabegron [Myrbetriq] 25 mg PO DAILY 02/13/19 [Last Taken 03/18/19]


Diltiazem HCl [Diltiazem 24Hr Cd] 240 mg PO DAILY 02/23/19 [Last Taken 03/18/19]


Furosemide [Lasix 20 MG (*)] 20 - 40 mg PO TID 02/23/19 [Last Taken 03/18/19]


Potassium Cl [Klor-Con 10 meq (RX)] 10 meq PO DAILY 02/23/19 [Last Taken 03/18/ 19]


Apixaban [Eliquis] 5 mg PO BID 03/19/19 [Last Taken 03/18/19]


hydrALAZINE [Apresoline 50 mg (*)] 50 mg PO BID@0800,2000 03/19/19 [Last Taken 

03/18/19]





I have personally reviewed and updated: medical history, social history, 

surgical history





- Past Medical History


atrial fibrillation, coronary artery disease, diabetes type 2, hypertension, 

hyperlipidemia





- Surgical History


Additional surgical history: as per HPI





- Social History


Smoking Status: Never smoked





Review of Systems


Review of Systems: 





Constitutional: Reports: other (LE edema)


EENMT: Reports: no symptoms


Cardiac: Reports: no symptoms


Respiratory: Reports: cough, shortness of breath


Gastrointestinal: Reports: no symptoms


Genitourinary: Reports: no symptoms


Muscolosketal: Reports: no symptoms


Skin: Reports: no symptoms


Neurological: Reports: no symptoms


Hematologic/Lymphatic: Reports: no symptoms


Immunologic/Allergy: Reports: no symptoms





Physical Exam


Physical Exam: 

















Temp Pulse Resp BP Pulse Ox


 


 36.4 C   67   20   162/88 H  90 L


 


 03/20/19 07:52  03/20/19 07:52  03/20/19 07:52  03/20/19 07:52  03/20/19 07:52




















O2 (L/minute)                  2














Constitutional: no apparent distress, appears nourished, not in pain


Eyes: anicteric sclera


Ears, Nose, Mouth, Throat: moist mucous membranes, hearing normal


Cardiovascular: regular rate and rhythym, no murmur, rub, or gallop


Respiratory: no respiratory distress, no rales or rhonchi, clear to auscultation


Gastrointestinal: soft, non-tender abdomen, No distension


Genitourinary: no bladder fullness


Skin: warm, normal color


Musculoskeletal: full muscle strength


Neurologic: AAOx3, sensation intact bilaterally


Psychiatric: interacting appropriately, not anxious, not encephalopathic, 

thought process linear





Lab Data & Imaging Review





 03/20/19 03:32





 03/20/19 03:32














WBC  7.50 10^3/uL (3.80-9.50)   03/20/19  03:32    


 


RBC  4.20 10^6/uL (4.40-6.38)  L  03/20/19  03:32    


 


Hgb  11.2 g/dL (13.7-17.5)  L  03/20/19  03:32    


 


Hct  36.4 % (40.0-51.0)  L  03/20/19  03:32    


 


MCV  86.7 fL (81.5-99.8)   03/20/19  03:32    


 


MCH  26.7 pg (27.9-34.1)  L  03/20/19  03:32    


 


MCHC  30.8 g/dL (32.4-36.7)  L  03/20/19  03:32    


 


RDW  19.3 % (11.5-15.2)  H  03/20/19  03:32    


 


Plt Count  180 10^3/uL (150-400)   03/20/19  03:32    


 


MPV  9.4 fL (8.7-11.7)   03/20/19  03:32    


 


Neut % (Auto)  67.4 % (39.3-74.2)   03/20/19  03:32    


 


Lymph % (Auto)  14.5 % (15.0-45.0)  L  03/20/19  03:32    


 


Mono % (Auto)  14.8 % (4.5-13.0)  H  03/20/19  03:32    


 


Eos % (Auto)  2.3 % (0.6-7.6)   03/20/19  03:32    


 


Baso % (Auto)  0.7 % (0.3-1.7)   03/20/19  03:32    


 


Nucleat RBC Rel Count  0.0 % (0.0-0.2)   03/20/19  03:32    


 


Absolute Neuts (auto)  5.06 10^3/uL (1.70-6.50)   03/20/19  03:32    


 


Absolute Lymphs (auto)  1.09 10^3/uL (1.00-3.00)   03/20/19  03:32    


 


Absolute Monos (auto)  1.11 10^3/uL (0.30-0.80)  H  03/20/19  03:32    


 


Absolute Eos (auto)  0.17 10^3/uL (0.03-0.40)   03/20/19  03:32    


 


Absolute Basos (auto)  0.05 10^3/uL (0.02-0.10)   03/20/19  03:32    


 


Absolute Nucleated RBC  0.00 10^3/uL (0-0.01)   03/20/19  03:32    


 


Immature Gran %  0.3 % (0.0-1.1)   03/20/19  03:32    


 


Immature Gran #  0.02 10^3/uL (0.00-0.10)   03/20/19  03:32    


 


PT  16.2 SEC (12.0-15.0)  H  03/20/19  03:32    


 


INR  1.36  (0.83-1.16)  H  03/20/19  03:32    


 


APTT  31.3 SEC (23.0-38.0)   03/19/19  11:50    


 


Sodium  138 mEq/L (135-145)   03/20/19  03:32    


 


Potassium  3.5 mEq/L (3.5-5.2)   03/20/19  03:32    


 


Chloride  104 mEq/L ()   03/20/19  03:32    


 


Carbon Dioxide  26 mEq/l (22-31)   03/20/19  03:32    


 


Anion Gap  8 mEq/L (6-14)   03/20/19  03:32    


 


BUN  17 mg/dL (7-23)   03/20/19  03:32    


 


Creatinine  0.8 mg/dL (0.7-1.3)   03/20/19  03:32    


 


Estimated GFR  > 60   03/20/19  03:32    


 


Glucose  102 mg/dL ()  H  03/20/19  03:32    


 


Calcium  8.5 mg/dL (8.5-10.4)   03/20/19  03:32    


 


Magnesium  1.8 mg/dL (1.6-2.3)   03/19/19  11:50    


 


NT-Pro-B Natriuret Pep  1580 pg/mL (0-125)  H  03/19/19  11:50    








Imaging Review: 





Please see [employer] for all imaging results





Assessment & Plan


Assessment: 





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


- Continue IV Lasix for fluid overload /CHF


- Imuran started for inflammatory component 


- Dr. Posey to evaluate for possible surgical need 





SVG-OM1 anastomotic lesion s/p stent 


- Continue Plavix As per Dr. Andrade 





PAF


- Continue Eliquis/BB/CCB





DM 2 


- Continue Metformin 





HTN


- Continue pre-op medications

## 2019-03-20 NOTE — ASMTCMCOM
CM Note

 

CM Note                       

Notes:

3/20/2019 Case Management Note



Discussed with RN this morning.  Pt admitted for pericarditis.  Cath lab yesterday.  Dr. Posey 

consult pending.  There are no therapy evals ordered today.



There are no identified case management d/c needs at this time d/t pt age, marital status, and 

independence with ADL's prior to admission.



Case Management d/c poc:  anticipating independent with follow up as directed.



Case Management available if needs change.

 

Date Signed:  03/20/2019 04:03 PM

Electronically Signed By:Latosha Davies RN

## 2019-03-21 LAB — PLATELET # BLD: 193 10^3/UL (ref 150–400)

## 2019-03-21 RX ADMIN — CLOPIDOGREL BISULFATE SCH MG: 75 TABLET, FILM COATED ORAL at 09:27

## 2019-03-21 RX ADMIN — PANTOPRAZOLE SODIUM SCH MG: 40 TABLET, DELAYED RELEASE ORAL at 07:34

## 2019-03-21 RX ADMIN — METOPROLOL TARTRATE SCH MG: 100 TABLET, FILM COATED ORAL at 20:15

## 2019-03-21 RX ADMIN — POTASSIUM CHLORIDE SCH MEQ: 1500 TABLET, EXTENDED RELEASE ORAL at 16:50

## 2019-03-21 RX ADMIN — SODIUM CHLORIDE SCH MG: 900 INJECTION, SOLUTION INTRAVENOUS at 07:27

## 2019-03-21 RX ADMIN — ASPIRIN SCH MG: 325 TABLET, FILM COATED ORAL at 07:42

## 2019-03-21 RX ADMIN — IRBESARTAN SCH MG: 150 TABLET ORAL at 07:34

## 2019-03-21 RX ADMIN — SODIUM CHLORIDE SCH MG: 900 INJECTION, SOLUTION INTRAVENOUS at 20:16

## 2019-03-21 RX ADMIN — POTASSIUM CHLORIDE SCH MEQ: 1500 TABLET, EXTENDED RELEASE ORAL at 09:27

## 2019-03-21 RX ADMIN — IRBESARTAN SCH MG: 150 TABLET ORAL at 20:16

## 2019-03-21 RX ADMIN — PANTOPRAZOLE SODIUM SCH MG: 40 TABLET, DELAYED RELEASE ORAL at 20:16

## 2019-03-21 RX ADMIN — METOPROLOL TARTRATE SCH MG: 100 TABLET, FILM COATED ORAL at 07:33

## 2019-03-21 RX ADMIN — DILTIAZEM HYDROCHLORIDE SCH MG: 240 CAPSULE, EXTENDED RELEASE ORAL at 09:27

## 2019-03-21 RX ADMIN — COLCHICINE SCH MG: 0.6 CAPSULE ORAL at 09:27

## 2019-03-21 RX ADMIN — THERA TABS SCH EACH: TAB at 07:34

## 2019-03-21 RX ADMIN — SODIUM CHLORIDE SCH MG: 900 INJECTION, SOLUTION INTRAVENOUS at 13:06

## 2019-03-21 RX ADMIN — METOLAZONE SCH MG: 5 TABLET ORAL at 13:07

## 2019-03-21 RX ADMIN — POTASSIUM CHLORIDE SCH MEQ: 1500 TABLET, EXTENDED RELEASE ORAL at 20:15

## 2019-03-21 RX ADMIN — COLCHICINE SCH MG: 0.6 CAPSULE ORAL at 20:16

## 2019-03-21 RX ADMIN — ASPIRIN SCH MG: 325 TABLET, FILM COATED ORAL at 20:15

## 2019-03-21 RX ADMIN — ASPIRIN SCH MG: 325 TABLET, FILM COATED ORAL at 13:06

## 2019-03-21 NOTE — SOAPPROG
SOAP Progress Note


Assessment/Plan: 


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


- Continue IV Lasix for fluid overload/CHF


- Imuran stopped and ASA/colchicine/steroids started as per Dr. Posey


- No plan for surgery





SVG-OM1 anastomotic lesion s/p stent 


- Continue Plavix As per Dr. Andrade 





h/o AVR


- Stable 





PAF


- Continue therapeutic Lovenox/BB/CCB


- Eliquis to be restarted closer to d/c





DM 2 


- Continue Metformin 





HTN


- Continue pre-op medications





Left pleural effusion 


- Thoracentesis today 





DVT prophylaxis 


- SCDs/Lovenox 





Disposition


- Plan for home Friday 03/21/19 08:59





Subjective: 


Breathing is better. Denies pain. Happy with care. 


Objective: 





 Vital Signs











Temp Pulse Resp BP Pulse Ox


 


 36.4 C   66   20   139/78 H  95 


 


 03/21/19 07:32  03/21/19 07:32  03/21/19 07:32  03/21/19 07:32  03/21/19 07:32








 Laboratory Results





 03/21/19 03:30 





 03/21/19 03:30 





 











 03/20/19 03/21/19 03/22/19





 05:59 05:59 05:59


 


Intake Total 100 1795 


 


Output Total  2775 


 


Balance 100 -980 








 











PT  16.2 SEC (12.0-15.0)  H  03/20/19  03:32    


 


INR  1.36  (0.83-1.16)  H  03/20/19  03:32    














Physical Exam





- Physical Exam


General Appearance: WD/WN, alert, no apparent distress


EENT: No scleral icterus (R), No scleral icterus (L)


Neck: normal inspection


Respiratory: No respiratory distress


Cardiac/Chest: regular rate, rhythm


Abdomen: non-tender, soft, No distended


Extremities: pedal edema


Neuro/Psych: no motor/sensory deficits, alert, normal mood/affect, oriented x 3





ICD10 Worksheet


Patient Problems: 


 Problems











Problem Status Onset


 


Acute blood loss as cause of postoperative anemia Acute  


 


Aortic stenosis Acute  


 


BPH w urinary obs/LUTS Acute  


 


Bronchitis Acute  


 


Coronary stent restenosis Acute  


 


Pleural effusion Acute  


 


Pneumonia Acute  


 


Recurrent pleural effusion on right Acute  


 


S/P coronary artery bypass graft x 1 Acute  


 


Paroxysmal atrial fibrillation Chronic

## 2019-03-21 NOTE — PDCARPN
Cardiology Progress Note


Chief Complaint: 





SOB


Assessment/Plan: 


Assessment:





SOB


hx of CABG/AVR


AF


pleural effusions




















Plan:





03/20/19 07:23


had CT, cath, TTE performed


mixed restrictive/constrictive picture by imaging and hemodynamics


d/w Dr. Posey--medical options to be tried first


Lasix IV today


continue plavix, re-start eliquis


check labs in AM.








03/21/19 07:30


Feels much better after diuresis


continue IV lasix


Imuran per CTS


Thoracentesis today


re-start AC tx per CTS





Subjective: 





stable


Reviewed/Discussed With: multidisciplinary team


Time Spent with Patient: greater than 25 minutes


Time Spent with Patient: Greater than 25 minutes spent on this patients care, 

greater than 50% of time spent counseling, educating, and coordinating care 

regarding the above mentioned plan.


Objective: 





 Vital Signs (8 Hrs)











  Temp Pulse Pulse Pulse Pulse Resp BP


 


 03/21/19 03:30  36.5 C  64     15  119/67


 


 03/21/19 00:40    67  67  72  














  BP BP BP Pulse Ox


 


 03/21/19 03:30     95


 


 03/21/19 00:40  114/76  118/76  125/66 H 








 Intake/Output (24 Hrs)











 03/20/19 03/21/19 03/22/19





 05:59 05:59 05:59


 


Intake Total 100 1795 


 


Output Total  2775 


 


Balance 100 -980 


 


Intake:   


 


  Oral (ml) 100 1795 


 


Output:   


 


  Urine (ml)  2775 


 


    Toilet  750 


 


    Urinal  2025 


 


Other:   


 


  Weight 84.504 kg 83.007 kg 


 


  Number of Voids   


 


    Toilet 1  











Result Diagrams: 


 03/21/19 03:30





 03/21/19 03:30





- Physical Exam


Constitutional: no apparent distress


Eyes: PERRL


Ears, Nose, Mouth, Throat: moist mucous membranes


Cardiovascular: regular rate and rhythm, systolic murmur


Peripheral Pulses: 1+: femoral (R), femoral (L)


Respiratory: no wheezes


Gastrointestinal: normoactive bowel sounds


Genitourinary: no suprapubic tenderness


Skin: no rashes


Musculoskeletal: no muscular tenderness


Neurologic: AAOx3


Psychiatric: cooperative





ICD10 Worksheet


Patient Problems: 


 Problems











Problem Status Onset


 


Acute blood loss as cause of postoperative anemia Acute  


 


Aortic stenosis Acute  


 


BPH w urinary obs/LUTS Acute  


 


Bronchitis Acute  


 


Coronary stent restenosis Acute  


 


Pleural effusion Acute  


 


Pneumonia Acute  


 


Recurrent pleural effusion on right Acute  


 


S/P coronary artery bypass graft x 1 Acute  


 


Paroxysmal atrial fibrillation Chronic

## 2019-03-22 VITALS — SYSTOLIC BLOOD PRESSURE: 103 MMHG | DIASTOLIC BLOOD PRESSURE: 54 MMHG

## 2019-03-22 RX ADMIN — CLOPIDOGREL BISULFATE SCH MG: 75 TABLET, FILM COATED ORAL at 08:24

## 2019-03-22 RX ADMIN — POTASSIUM CHLORIDE SCH MEQ: 1500 TABLET, EXTENDED RELEASE ORAL at 08:20

## 2019-03-22 RX ADMIN — IRBESARTAN SCH MG: 150 TABLET ORAL at 08:20

## 2019-03-22 RX ADMIN — SODIUM CHLORIDE SCH MG: 900 INJECTION, SOLUTION INTRAVENOUS at 05:27

## 2019-03-22 RX ADMIN — ASPIRIN SCH MG: 325 TABLET, FILM COATED ORAL at 13:30

## 2019-03-22 RX ADMIN — COLCHICINE SCH MG: 0.6 CAPSULE ORAL at 08:22

## 2019-03-22 RX ADMIN — DILTIAZEM HYDROCHLORIDE SCH MG: 240 CAPSULE, EXTENDED RELEASE ORAL at 08:24

## 2019-03-22 RX ADMIN — POTASSIUM CHLORIDE SCH MEQ: 1500 TABLET, EXTENDED RELEASE ORAL at 16:20

## 2019-03-22 RX ADMIN — PANTOPRAZOLE SODIUM SCH MG: 40 TABLET, DELAYED RELEASE ORAL at 08:20

## 2019-03-22 RX ADMIN — ASPIRIN SCH MG: 325 TABLET, FILM COATED ORAL at 05:27

## 2019-03-22 RX ADMIN — METOLAZONE SCH MG: 5 TABLET ORAL at 08:22

## 2019-03-22 RX ADMIN — METOPROLOL TARTRATE SCH MG: 100 TABLET, FILM COATED ORAL at 08:24

## 2019-03-22 RX ADMIN — THERA TABS SCH EACH: TAB at 08:24

## 2019-03-22 NOTE — PDCARPN
Cardiology Progress Note


Chief Complaint: 





SOB


Assessment/Plan: 


Assessment:





SOB


hx of CABG/AVR


AF


pleural effusions




















Plan:





03/20/19 07:23


had CT, cath, TTE performed


mixed restrictive/constrictive picture by imaging and hemodynamics


d/w Dr. Posey--medical options to be tried first


Lasix IV today


continue plavix, re-start eliquis


check labs in AM.








03/21/19 07:30


Feels much better after diuresis


continue IV lasix


Imuran per CTS


Thoracentesis today


re-start AC tx per CTS





03/22/19 08:26


feels much better


continue per CTS


will f/u as outpatient


Salt/fluid restriction to continue


re-start eliquis if no procedures planned





03/22/19 08:28





Subjective: 





doing well


Reviewed/Discussed With: multidisciplinary team


Time Spent with Patient: greater than 25 minutes


Time Spent with Patient: Greater than 25 minutes spent on this patients care, 

greater than 50% of time spent counseling, educating, and coordinating care 

regarding the above mentioned plan.


Objective: 





 Vital Signs (8 Hrs)











  Temp Pulse Resp BP Pulse Ox


 


 03/22/19 08:00  36.3 C  64  18  131/76 H  94


 


 03/22/19 04:00  36.6 C  64  16  96/59 L  96








 Intake/Output (24 Hrs)











 03/21/19 03/22/19 03/23/19





 05:59 05:59 05:59


 


Intake Total 1795 1150 


 


Output Total 2775 2450 825


 


Balance -980 -1300 -825


 


Intake:   


 


  Oral (ml) 1795 1150 


 


Output:   


 


  Urine (ml) 2775 1900 825


 


    Toilet 750  


 


    Urinal 2025 1900 825


 


  Thoracentesis  550 


 


Other:   


 


  Weight 83.007 kg 82.463 kg 











Result Diagrams: 


 03/21/19 03:30





 03/22/19 03:26





- Physical Exam


Constitutional: no apparent distress


Eyes: PERRL


Ears, Nose, Mouth, Throat: moist mucous membranes


Cardiovascular: regular rate and rhythm


Peripheral Pulses: 1+: femoral (R), femoral (L)


Respiratory: no crackles


Gastrointestinal: normoactive bowel sounds


Genitourinary: no suprapubic tenderness


Skin: no rashes


Musculoskeletal: no muscular tenderness


Neurologic: AAOx3


Psychiatric: cooperative





ICD10 Worksheet


Patient Problems: 


 Problems











Problem Status Onset


 


Acute blood loss as cause of postoperative anemia Acute  


 


Aortic stenosis Acute  


 


BPH w urinary obs/LUTS Acute  


 


Bronchitis Acute  


 


Coronary stent restenosis Acute  


 


Pleural effusion Acute  


 


Pneumonia Acute  


 


Recurrent pleural effusion on right Acute  


 


S/P coronary artery bypass graft x 1 Acute  


 


Paroxysmal atrial fibrillation Chronic

## 2019-03-22 NOTE — SOAPPROG
SOAP Progress Note


Assessment/Plan: 


Assessment: PPD#3 MICH distal anastomosis of OM vein graft


PPD#1 left thoracentesis, 550 ml


Approx 5 mo s/p tissue AVR/CABG1 compl by postpericardiotomy syndrome w 

recurrent pleural effusions


Approx 5 weeks s/p RVATS talc pleurodesis 








Acute dCHF w bilat pleural effusions and significant leg edema - Exacerbated by 

mixed restrictive/constrictive pericarditis. Moderate sized left effusion 

tapped. Small right sized effusion monitored. Good clinical response to 

aggressive diuresis. Anti-inflammatory regimen switched to high dose ASA/

colchicine/steroids as per Dr. Posey. Pericardial stripping considered but 

deferred.





CAD w patent LAD stent, nonobstructive RCA disease and recent OM bypass 

grafting - High grade stenosis of distal mitchell of OM graft identified by 

precautionary LHC. Wide patency restored with a MICH. Plavix added to 

antiplatelet regimen. Duration of DAPT as per Dr. Andrade. 





Presence of bioprosthetic aortic valve - Stable. Nl valve fx confirmed by echo.





Hx PAF during admission for viral pneumonitis last month - Stable. Predominant 

rhythm this admission sinus bradycardia (on BB/CCB). Antithrombotic prophylaxis 

switched from Eliquis to DAPT. 





DM2 - Well controlled on Metformin.





HTN - Controlled on combination therapy.

















Plan:


Stop hydralazine.


Stop metolazone.


Convert to oral lasix.


Ok for home later today.





03/22/19 08:44

















Subjective: 





Better everyday. Ongoing improvements in stamina and degree of leg swelling.


Objective: 





 Vital Signs











Temp Pulse Resp BP Pulse Ox


 


 36.3 C   64   18   131/76 H  94 


 


 03/22/19 08:00  03/22/19 08:00  03/22/19 08:00  03/22/19 08:00  03/22/19 08:00








 Laboratory Results





 03/21/19 03:30 





 03/22/19 03:26 





 











 03/21/19 03/22/19 03/23/19





 05:59 05:59 05:59


 


Intake Total 1795 1150 


 


Output Total 2775 6770 825


 


Balance -980 -1300 -825








 











PT  16.2 SEC (12.0-15.0)  H  03/20/19  03:32    


 


INR  1.36  (0.83-1.16)  H  03/20/19  03:32    








HR and BP controlled.


CXR unchanged small residual rt pleural effusion, no reaccumulation of left 

pleural effusion.


Stable sats on RA.


Cont vigorous diuresis. Weight now 2 kg below admission.





Physical Exam





- Physical Exam


General Appearance: alert, no apparent distress


Respiratory: decreased breath sounds (rt base)


Cardiac/Chest: regular rate, rhythm, bradycardia, other (Sternotomy, RVATS ports

, and LLE venotomy well healed scars)


Abdomen: non-tender, soft


Skin: warm/dry


Extremities: swelling (1-2+ dependent)





ICD10 Worksheet


Patient Problems: 


 Problems











Problem Status Onset


 


Acute blood loss as cause of postoperative anemia Acute  


 


Aortic stenosis Acute  


 


BPH w urinary obs/LUTS Acute  


 


Bronchitis Acute  


 


Coronary stent restenosis Acute  


 


Pleural effusion Acute  


 


Pneumonia Acute  


 


Recurrent pleural effusion on right Acute  


 


S/P coronary artery bypass graft x 1 Acute  


 


Paroxysmal atrial fibrillation Chronic

## 2019-03-22 NOTE — ASMTDCNOTE
Case Management Discharge

 

Discharge Order Complete?     Answers:  Yes                                   

Patient to Obtain             Answers:  Independently                         

Medications                                                                   

Discharge Comments            

Notes:

3/22/2019 Case Management Note



Pt discharged independent with follow up as directed.

 

Date Signed:  03/22/2019 04:58 PM

Electronically Signed By:Latosha Davies RN

## 2019-03-22 NOTE — ASMTLACE
LACE

 

Length of stay for            Answers:  3 days                                

current admission                                                             

Acuity / Level of             Answers:  Yes                                   

Care: Did the patient                                                         

have an inpatient                                                             

admission?                                                                    

Comorbidities - select        Answers:  Coronary Artery Disease               

all that apply                                                                

                                        Diabetes (uncontrolled or             

                                        controlled)                           

                                        Other                         Notes:  HTN

# of Emergency department     Answers:  1-2                                   

visits in the last 6                                                          

months                                                                        

Score: 11

 

Date Signed:  03/22/2019 04:49 PM

Electronically Signed By:Latosha Davies RN

## 2019-03-22 NOTE — PDDCSUM
Discharge Summary


Discharge Summary: 


DATE OF ADMISSION: 3/19/19





DATE OF DISCHARGE: 3/22/19





DISPOSITION: Home, self-care





PRINCIPAL ADMISSION DIAGNOSIS: Acute congestive heart failure





PRINCIPAL DISCHARGE DIAGNOSES:


1. Decompensated diastolic congestive heart failure with bilateral pleural 

effusions


2. Mild constrictive and restrictive pericarditis


3. 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


4. Status post placement of a drug eluting stent in the saphenous vein graft to 

the principal obtuse marginal artery 


5. Status post left thoracentesis





HISTORY OF PRESENT ILLNESS:


74 yo male with a hx of an AVR/CABG in Nov 2018 complicated by 

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 

eventual RVATS talc pleurodesis, admitted from clinic for further evaluation of 

worsening dyspnea and leg edema.





PERTINENT PAST MEDICAL HISTORY:


HTN, dyslipidemia, rate controlled PAF, chronic anticoagulation with Eliquis 

post cardiac surgery for [id number] score of 4, well controlled type II 

diabetes, BPH, overactive bladder, severe AS prior to AVR, CAD s/p multiple 

PCIs prox LAD/mid RCA/OM1/OM2 with mild ISR LAD, mild ISR RCA, mild-mod ISR OM2 

and severe ISR OM1 prior to CABG, post pericardiotomy syndrome, corona virus 

pneumonitis last month





PERTINENT PAST PROCEDURAL HISTORY:


1. AVR#23 Magna bioprosthesis and CABG x 1 (SV-OM1) 11/8/18 by Dr Linares


2. RVATS talc pleurodesis 2/13/19 by Dr Parks


3. Left thoracentesis for 750 ml 2/24/19


4. Left thoracentesis for 900 ml at outside hospital 1 wk prior to admission


 


MEDICATIONS ON ADMISSION:


Eliquis 5 mg BID, MVI daily, Myrbetriq 25 mg daily, Metformin 500 mg BID, 

Irbesartan 150 mg BID, Metoprolol tartrate 100 mg BID, Diltiazem  mg daily

, Hydralazine 50 mg BID, Lasix 20-40 mg TID prn swelling, Klor-Con 10 meq daily

, Lipitor 40 mg every other day, Breo Ellipta MDI 1 puff daily, Albuterol MDI 1-

2 puffs q4h prn





ALLERGIES/SENSITIVITIES:


Clindamycin causing a rash, codeine causing a rash





CONSULTANTS:


Cardiology ([facility])





PROCEDURES/IMAGING:


3/19 CT chest: small pericardial effusion, upper normal thickness of right 

sided pericardium without calcification, moderate sized left pleural effusion, 

small partially loculated right pleural effusion, diffuse interstitial edema


3/19 ([facility]) Transthoracic echocardiogram: No pericardial effusion. Nl BiV size 

and systolic fx. LVEF 60-65%. Grade 3 diastolic dysfunction. Mild LAE w elev 

filling pressures. Nl sized RA with elev filling pressures. Nl bioprosthetic AV 

fx. Mild MR. No sig TR. Mildly thickened pericardium. Mitral/tricuspid and 

hepatic vein inflow variation c/w constrictive pericarditis. 


3/19 ([facility]) Right and left cardiac catheterization with selective coronary 

angiography and left ventriculogram. Successful placement of a 2.25 x 12 mm 

Synergy drug eluting stent in the distal saphenous vein graft to the OM1: 

Findings: Patent stents except for a high grade anastomotic lesion of the 

distal OM bypass graft w unobstructed flow restored by stenting. LVEF 65-70%, 

RA 20, RVSP 52, RVEDP 19, PCWP 20, LVEDP 13, PA 51/20, CI 2.1, resp variations 

btwn LVSPs and RVSPs.


3/21 ([facility]) US guided left thoracentesis for 550 ml 





ABBREVIATED HOSPITAL COURSE BY ACTIVE PROBLEM LIST:


1. Acute dCHF w bilat pleural effusions, BNP of 1580, and significant leg edema 

- Exacerbated by mixed restrictive/constrictive pericarditis as evidenced by 

imaging and hemodynamic data. Moderate sized left effusion tapped. Small right 

sized effusion monitored. Good clinical response to aggressive diuresis. Anti-

inflammatory regimen switched to high dose ASA/colchicine/steroids as per Dr. Posey. No clear indication for pericardial stripping and surgery deferred.


2. CAD - High grade stenosis of distal mitchell of OM graft identified by cath. 

Wide patency restored with a MICH. Plavix added to antiplatelet regimen. 

Duration of DAPT as per Dr. Andrade. Secondary prevention o/w with BB and 

statin as per home regimen.


3. Presence of bioprosthetic aortic valve - Stable. Nl valve fx confirmed by 

echo.


4. Hx rate controlled PAF/chronic anticoagulation on Eliquis  - No atrial 

arrhythmias this admission. HR predominantly in the 60s, occasionally 50s, rare 

upper 40s. Antithrombotic prophylaxis switched to high dose ASA and Plavix. 

Eliquis to be resumed if recurrent/persistent AF. Event monitor to be 

considered as outpt.





DISCHARGE CLINICAL INFORMATION:


HR 48-60s.  SBP 100s-130s . SpO2 96% RA. Wt 2 kg below admission at 84.5 kilos.


WBC 6.2, Hgb 11.1, HCT 35.8, Plt 193, Na , K 4, Cr 0.8





DISCHARGE MEDICATIONS:


As on admission with the following adjustments:


1. Hold Eliquis


2. Hold Hydralazine. Ok to resume 25 mg up to 2x daily if SBP consistently > 

140. Increase dose to 50 mg 2x daily if SBP consistently > 160. 


3. Hold Irbesartan for SBP < 110.


4. Decrease metoprolol to 50 mg if HR consistently < 55.


5. Increase Lasix to 80 mg TID. Reduce dose to 80 mg daily once back to usual 

weight and no leg swelling.


6. Change Klor-Con to 20 meq with each 80 mg dose of Lasix.


NEW prescriptions:


1. Aspirin 650 mg TID


2. Plavix 75 mg daily


3. Colchicine 0.6 mg BID


4. Prednisone 40 mg daily


5. Protonix 40 mg BID while on high dose ASA and prednisone.





FOLLOW UP APPOINTMENTS:


1. CV surgery: with Dr Posey at [facility] on 3/28 at 1:00 pm.


2. Cardiology: with Dr Andrade at [facility] as directed.





FOLLOW UP TESTING:


CBC, CMP and CXR prior to surgical appointment.

## 2019-03-28 ENCOUNTER — HOSPITAL ENCOUNTER (OUTPATIENT)
Dept: HOSPITAL 80 - FIMAGING | Age: 74
End: 2019-03-28
Attending: THORACIC SURGERY (CARDIOTHORACIC VASCULAR SURGERY)
Payer: COMMERCIAL

## 2019-03-28 DIAGNOSIS — Z95.1: ICD-10-CM

## 2019-03-28 DIAGNOSIS — Z09: Primary | ICD-10-CM

## 2019-03-28 DIAGNOSIS — Z95.2: ICD-10-CM

## 2019-04-30 ENCOUNTER — HOSPITAL ENCOUNTER (OUTPATIENT)
Dept: HOSPITAL 80 - FIMAGING | Age: 74
End: 2019-04-30
Attending: THORACIC SURGERY (CARDIOTHORACIC VASCULAR SURGERY)
Payer: COMMERCIAL

## 2019-04-30 DIAGNOSIS — J90: Primary | ICD-10-CM

## 2019-06-19 ENCOUNTER — HOSPITAL ENCOUNTER (OUTPATIENT)
Dept: HOSPITAL 80 - EMCIMAGING | Age: 74
End: 2019-06-19
Payer: COMMERCIAL

## 2019-06-24 ENCOUNTER — HOSPITAL ENCOUNTER (OUTPATIENT)
Dept: HOSPITAL 80 - EMCIMAGING | Age: 74
End: 2019-06-24
Payer: COMMERCIAL

## 2020-04-06 NOTE — ASMTLACE
LACE

 

Length of stay for            Answers:  2 days                                

current admission                                                             

Comorbidities - select        Answers:  Any tumor (including                  

all that apply                          lymphoma or leukemia)                 

                                        Coronary Artery Disease               

                                        Diabetes (uncontrolled or             

                                        controlled)                           

                                        Other                         Notes:  AFib; Hx of CABG; HTN

# of Emergency department     Answers:  1-2                                   

visits in the last 6                                                          

months                                                                        

Score: 9

 

Date Signed:  02/25/2019 04:09 PM

Electronically Signed By:Elo Mittal RN non-verbal indicators of pain/discomfort absent